# Patient Record
Sex: FEMALE | Race: WHITE | Employment: PART TIME | URBAN - METROPOLITAN AREA
[De-identification: names, ages, dates, MRNs, and addresses within clinical notes are randomized per-mention and may not be internally consistent; named-entity substitution may affect disease eponyms.]

---

## 2017-01-30 DIAGNOSIS — I10 ESSENTIAL HYPERTENSION WITH GOAL BLOOD PRESSURE LESS THAN 140/90: ICD-10-CM

## 2017-01-30 RX ORDER — LOSARTAN POTASSIUM AND HYDROCHLOROTHIAZIDE 25; 100 MG/1; MG/1
TABLET ORAL
Qty: 30 TAB | Refills: 5 | Status: SHIPPED | OUTPATIENT
Start: 2017-01-30 | End: 2017-06-13

## 2017-01-30 RX ORDER — METFORMIN HYDROCHLORIDE 500 MG/1
TABLET ORAL
Qty: 360 TAB | Refills: 1 | Status: SHIPPED | OUTPATIENT
Start: 2017-01-30 | End: 2017-08-02 | Stop reason: SDUPTHER

## 2017-03-09 ENCOUNTER — TELEPHONE (OUTPATIENT)
Dept: INTERNAL MEDICINE CLINIC | Age: 62
End: 2017-03-09

## 2017-03-09 DIAGNOSIS — E11.65 TYPE 2 DIABETES MELLITUS WITH HYPERGLYCEMIA, WITH LONG-TERM CURRENT USE OF INSULIN (HCC): ICD-10-CM

## 2017-03-09 DIAGNOSIS — Z79.4 TYPE 2 DIABETES MELLITUS WITH HYPERGLYCEMIA, WITH LONG-TERM CURRENT USE OF INSULIN (HCC): ICD-10-CM

## 2017-03-09 RX ORDER — GLYBURIDE 5 MG/1
TABLET ORAL
Qty: 360 TAB | Refills: 0 | Status: SHIPPED | OUTPATIENT
Start: 2017-03-09 | End: 2017-06-05 | Stop reason: ALTCHOICE

## 2017-03-09 NOTE — TELEPHONE ENCOUNTER
Contacted pharmacy to advise that pt should not be on both HCTZ medications. Continue with Diovan. Pharmacy understood and will let pt know and discontinue Hyzaar.

## 2017-03-09 NOTE — TELEPHONE ENCOUNTER
[facility] states that patient is telling them that she is taking both Losartan Hydrochlorothiazide and valsartan hydrochlorothiazide and they are wondering if the patient should be taking both of them. They also wanted to know if the refill request that was sent over this morning can be filled also as the patient is going out of town on Saturday.

## 2017-03-21 ENCOUNTER — TELEPHONE (OUTPATIENT)
Dept: INTERNAL MEDICINE CLINIC | Age: 62
End: 2017-03-21

## 2017-03-21 DIAGNOSIS — I10 ESSENTIAL HYPERTENSION WITH GOAL BLOOD PRESSURE LESS THAN 140/90: ICD-10-CM

## 2017-03-21 RX ORDER — VALSARTAN AND HYDROCHLOROTHIAZIDE 320; 25 MG/1; MG/1
TABLET, FILM COATED ORAL
Qty: 90 TAB | Refills: 0 | Status: SHIPPED | OUTPATIENT
Start: 2017-03-21 | End: 2017-06-27 | Stop reason: SDUPTHER

## 2017-03-21 NOTE — TELEPHONE ENCOUNTER
[employer] # [id number] request a return call regarding clarification on Rx Diovan.  [facility] contact  [employer] (pharmacist) [phone]

## 2017-03-21 NOTE — TELEPHONE ENCOUNTER
Contacted pharmacist who is questioning pts medications. The Hyzaar has been discontinued but he is still reflecting that pt is utilizing Valsartan 40mg BID by physician Christos Gonzalez. Advised pharmacist that I would call their office and coordinate pts medications. Per last consult note from endo pt is to take Valsartan 40 mg BID but not listing for Valsartan-HCTZ provided by our office. Dr. Rebekah Dhillon nurse will speak with Dr. Rebekah Dhillon tomorrow and have her return our call.

## 2017-03-22 NOTE — TELEPHONE ENCOUNTER
Dr. Yesenia Cadet returned call this morning and said that she is fine discontinuing the Valsartan 40 mg BID and leaving pt on Diovan-HCTZ along with amlodipine and Metoprolol. Contacted pharmacy to advise of outcome with medication decision. He understood and will discontinue on their end and contact pt.

## 2017-04-06 DIAGNOSIS — Z79.4 TYPE 2 DIABETES MELLITUS WITH HYPERGLYCEMIA, WITH LONG-TERM CURRENT USE OF INSULIN (HCC): ICD-10-CM

## 2017-04-06 DIAGNOSIS — E11.65 TYPE 2 DIABETES MELLITUS WITH HYPERGLYCEMIA, WITH LONG-TERM CURRENT USE OF INSULIN (HCC): ICD-10-CM

## 2017-04-06 RX ORDER — GLYBURIDE 5 MG/1
TABLET ORAL
Qty: 360 TAB | Refills: 0 | Status: SHIPPED | OUTPATIENT
Start: 2017-04-06 | End: 2017-05-03 | Stop reason: SDUPTHER

## 2017-04-20 ENCOUNTER — HOSPITAL ENCOUNTER (OUTPATIENT)
Dept: MAMMOGRAPHY | Age: 62
Discharge: HOME | End: 2017-04-20
Attending: INTERNAL MEDICINE
Payer: COMMERCIAL

## 2017-04-20 DIAGNOSIS — Z12.31 VISIT FOR SCREENING MAMMOGRAM: ICD-10-CM

## 2017-04-20 PROCEDURE — 77067 SCR MAMMO BI INCL CAD: CPT

## 2017-05-03 DIAGNOSIS — E11.65 TYPE 2 DIABETES MELLITUS WITH HYPERGLYCEMIA, WITH LONG-TERM CURRENT USE OF INSULIN (HCC): ICD-10-CM

## 2017-05-03 DIAGNOSIS — Z79.4 TYPE 2 DIABETES MELLITUS WITH HYPERGLYCEMIA, WITH LONG-TERM CURRENT USE OF INSULIN (HCC): ICD-10-CM

## 2017-05-04 RX ORDER — GLYBURIDE 5 MG/1
TABLET ORAL
Qty: 360 TAB | Refills: 0 | Status: SHIPPED | OUTPATIENT
Start: 2017-05-04 | End: 2017-06-05 | Stop reason: ALTCHOICE

## 2017-05-09 ENCOUNTER — HOSPITAL ENCOUNTER (OUTPATIENT)
Dept: MAMMOGRAPHY | Age: 62
Discharge: HOME | End: 2017-05-09
Payer: COMMERCIAL

## 2017-05-09 DIAGNOSIS — R92.8 ABNORMAL MAMMOGRAM: ICD-10-CM

## 2017-05-09 PROCEDURE — 77065 DX MAMMO INCL CAD UNI: CPT

## 2017-05-11 ENCOUNTER — TELEPHONE (OUTPATIENT)
Dept: INTERNAL MEDICINE CLINIC | Age: 62
End: 2017-05-11

## 2017-05-11 NOTE — TELEPHONE ENCOUNTER
Contacted pt to advise of biopsy recommendation. Pt understood and already has appt with Mabel's office.

## 2017-05-11 NOTE — TELEPHONE ENCOUNTER
----- Message from Tiago Senior MD sent at 5/9/2017  4:46 PM EDT -----  Please call she needs biopsy of left breast- ? Meron Nixon

## 2017-05-31 DIAGNOSIS — R92.1 BREAST CALCIFICATIONS: Primary | ICD-10-CM

## 2017-06-05 ENCOUNTER — HOSPITAL ENCOUNTER (OUTPATIENT)
Dept: MAMMOGRAPHY | Age: 62
Discharge: HOME | End: 2017-06-05
Attending: SURGERY
Payer: COMMERCIAL

## 2017-06-05 ENCOUNTER — HOSPITAL ENCOUNTER (OUTPATIENT)
Dept: LAB | Age: 62
Discharge: HOME | End: 2017-06-05

## 2017-06-05 ENCOUNTER — DOCUMENTATION ONLY (OUTPATIENT)
Dept: SURGERY | Age: 62
End: 2017-06-05

## 2017-06-05 ENCOUNTER — OFFICE VISIT (OUTPATIENT)
Dept: SURGERY | Age: 62
End: 2017-06-05

## 2017-06-05 VITALS
HEIGHT: 66 IN | DIASTOLIC BLOOD PRESSURE: 61 MMHG | HEART RATE: 70 BPM | BODY MASS INDEX: 31.5 KG/M2 | SYSTOLIC BLOOD PRESSURE: 156 MMHG | WEIGHT: 196 LBS

## 2017-06-05 DIAGNOSIS — R92.1 BREAST CALCIFICATIONS: ICD-10-CM

## 2017-06-05 DIAGNOSIS — R92.0 MICROCALCIFICATION OF LEFT BREAST ON MAMMOGRAM: Primary | ICD-10-CM

## 2017-06-05 PROCEDURE — 77065 DX MAMMO INCL CAD UNI: CPT

## 2017-06-05 PROCEDURE — 77030030538 MAM STEREO VAC  BX BREAST LT 1ST LESION W/CLIP AND SPECIMEN

## 2017-06-05 RX ORDER — GUAIFENESIN 100 MG/5ML
LIQUID (ML) ORAL
COMMUNITY
Start: 2016-12-07 | End: 2016-12-07

## 2017-06-05 NOTE — LETTER
6/7/2017 9:07 AM 
 
Patient:  Obed Guaman YOB: 1955 Date of Visit: 6/5/2017 Dear Dr. Salma Denton: 
 
 
Thank you for referring Ms. Vonda Anglin to me for evaluation/treatment. Below are the relevant portions of my assessment and plan of care. HISTORY OF PRESENT ILLNESS Obed Guaman is a 57 y.o. female. HPI 
NEW Patient presents for consultation at the request of Dr. Tony Infante for LEFT breast microcalcifications. Patient is not able to palpate a mass. No hx of breast biopsies. 
  
FH: 
- Mother diagnosed with breast cancer at age 61. 
  
Screening mammogram done 4/20//17: BI-RADS 0. LEFT breast diagnostic mammogram done 5/9/17: BI-RADS 4. LEFT breast clustered microcalcs. Past Medical History:  
Diagnosis Date  Diabetes ([facility] [address]  Hyperlipidemia  Hypertension Past Surgical History:  
Procedure Laterality Date  ENDOSCOPY, COLON, DIAGNOSTIC    
 2002 normal  
 HX APPENDECTOMY  HX GYN    
 molar pregnancy  HX GYN    
 CHIQUI part OOphorectomy Social History Social History  Marital status:  Spouse name: N/A  
 Number of children: N/A  
 Years of education: N/A Occupational History  Not on file. Social History Main Topics  Smoking status: Never Smoker  Smokeless tobacco: Never Used  Alcohol use No  
 Drug use: No  
 Sexual activity: No  
 
Other Topics Concern  Not on file Social History Narrative Current Outpatient Prescriptions on File Prior to Visit Medication Sig Dispense Refill  valsartan-hydroCHLOROthiazide (DIOVAN-HCT) 320-25 mg per tablet TAKE 1 TABLET BY MOUTH EVERY DAY 90 Tab 0  
 metFORMIN (GLUCOPHAGE) 500 mg tablet TAKE 2 TABLETS BY MOUTH TWICE A DAY WITH MEALS 360 Tab 1  
 metoprolol succinate (TOPROL-XL) 100 mg tablet TAKE 1 TABLET BY MOUTH DAILY.  90 Tab 1  
 amLODIPine (NORVASC) 10 mg tablet TAKE 1 TABLET BY MOUTH EVERY DAY 90 Tab 1  
  LEVEMIR FLEXTOUCH 100 unit/mL (3 mL) inpn INJECT 120 UNITS SUBCUTANEOUSLY DAILY 45 Pen 3  
 glyBURIDE (DIABETA) 5 mg tablet TAKE 2 TABLETS BY MOUTH TWICE A DAY **MUST BE SEEN FOR FURTHER REFILLS** 120 Tab 5  
 insulin detemir (LEVEMIR FLEXTOUCH) 100 unit/mL (3 mL) inpn INJECT 130 UNITS SUBCUTANEOUSLY DAILY 45 Pen 3  
 Insulin Needles, Disposable, (NOVOFINE 30) 30 gauge x 1/3\" USE TO INJECT INSULIN 6-8 UNITS PRIOR TO MEALS 100 Pen Needle 2  
 insulin aspart (NOVOLOG) 100 unit/mL inpn 20 units at breakfast, 26 units at lunch, 26 units at dinner. ( please give one month) 1 Pen 5  
 glucose blood VI test strips (ONETOUCH ULTRA TEST) strip Test for times daily as directed. 100 Strip 2  valsartan (DIOVAN) 40 mg tablet Take 25 mg by mouth two (2) times a day. 3  
 [DISCONTINUED] glyBURIDE (DIABETA) 5 mg tablet TAKE 2 TABLETS BY MOUTH TWICE A DAY WITH MEALS 360 Tab 0  
 insulin detemir (LEVEMIR FLEXTOUCH) 100 unit/mL (3 mL) inpn 120 Units by SubCUTAneous route daily for 30 days. Please provide a 30 day supply 3 Pen 3  
 [DISCONTINUED] glyBURIDE (DIABETA) 5 mg tablet TAKE 2 TABLETS BY MOUTH TWICE A DAY WITH MEALS 360 Tab 0  
 losartan-hydroCHLOROthiazide (HYZAAR) 100-25 mg per tablet TAKE 1 TABLET BY MOUTH EVERY DAY 30 Tab 5  [DISCONTINUED] glyBURIDE (DIABETA) 5 mg tablet TAKE 2 TABLETS BY MOUTH TWICE A DAY **MUST BE SEEN FOR FURTHER REFILLS** 120 Tab 0  [DISCONTINUED] glyBURIDE (DIABETA) 5 mg tablet TAKE 2 TABLETS BY MOUTH TWICE A DAY **NEEDS APPOINTMENT** 120 Tab 0  [DISCONTINUED] glyBURIDE (DIABETA) 5 mg tablet TAKE 2 TABLETS BY MOUTH TWICE A DAY **NEEDS APPOINTMENT** 120 Tab 5  [DISCONTINUED] glyBURIDE (DIABETA) 5 mg tablet TAKE 2 TABLETS BY MOUTH TWICE A DAY **MUST BE SEEN FOR FURTHER REFILLS** 120 Tab 5  
 olmesartan (BENICAR) 20 mg tablet Take 1 Tab by mouth daily for 30 days. 30 Tab 3 No current facility-administered medications on file prior to visit. No Known Allergies OB History Obstetric Comments Menarche:  13. LMP: 34.  # of Children:  2. Age at Delivery of First Child:  19.   Hysterectomy/oophorectomy:  YES/YES. Breast Bx:  no.  Hx of Breast Feeding:  no. BCP:  yes. Hormone therapy:  no.  
 
  
  
 
 
ROS Constitutional: Negative. HENT: Negative. Eyes: Negative. Cataracts Respiratory: Negative. Cardiovascular: Negative. Gastrointestinal: Negative. Genitourinary: Negative. Musculoskeletal: Negative. Skin: Negative. Neurological: Negative. Endo/Heme/Allergies: Negative. Psychiatric/Behavioral: Negative. Physical Exam  
Cardiovascular: Normal rate and normal heart sounds. Pulmonary/Chest: Breath sounds normal. Right breast exhibits no inverted nipple, no mass, no nipple discharge, no skin change and no tenderness. Left breast exhibits no inverted nipple, no mass, no nipple discharge, no skin change and no tenderness. Breasts are symmetrical.  
Lymphadenopathy:  
     Right cervical: No superficial cervical, no deep cervical and no posterior cervical adenopathy present. Left cervical: No superficial cervical, no deep cervical and no posterior cervical adenopathy present. Right axillary: No pectoral and no lateral adenopathy present. Left axillary: No pectoral and no lateral adenopathy present. Stereotactic Biopsy PROCEDURE : LORAD STEREOTACTIC VACUUM ASSISTED BIOPSY AND RELATED DIGITAL MAMMOGRAPHY OF THE, left BREAST. INDICATION : MICROCALCIFICATIONS. CONSENT : Following a detailed description of the LORAD stereotactic core biopsy procedure, its risks, benefits and possible alternatives (open biopsy), the patient signed the informed consent. The risks and benefits of the procedure have been explained to the patient by the stereotactic technologist and Dr. Escobar Sue.   
IMAGING : Prebiopsy digital stereotactic imaging of the breast were obtained and confirmed the presence of the abnormality in both  and stereotactic views. , The  left Breast was imaged. PROJECTION : CC. 
BIOPSY PROCEDURE : Following sterile preparation of the skin, a cutaneous skin wheel of 1% lidocaine was use as a local anesthetic. a 4mm skin incision was made for the entry site of the biopsy needle. Prefire stereotactic images were obtained to confirm accurate targeting., An 7 gauge Encor needle was used for the biopsy, 7 biopsy specimens were obtained. CLIP PLACEMENT : A marking clip was placed for future mammographic reference and position was confirmed with a 0 degree postfire image. Radha Waller SPECIMEN RADIOGRAPHY : Digital spot mammography of the core biopsy specimens demonstrated microcalcifications corresponding to the targeted calcifications. Radha Waller FINAL PATHOLOGY : Is pending at this time. Radha Waller POST BIOPSY MAMMOGRAM : CLIP IN GOOD POSITION. ASSESSMENT and PLAN 
  ICD-10-CM ICD-9-CM 1. Microcalcification of left breast on mammogram R92.0 793.81 SURGICAL PATHOLOGY Pt presents with abnormal mammo of LT breast with new calcifications. Biopsied site today without complications, which pt tolerated well. Will f/u once path results become available. This plan was reviewed with the patient and patient agrees. All questions were answered. Written by Marina Rizo, as dictated by Dr. Michael Ledesma MD.  
 
If you have questions, please do not hesitate to call me. I look forward to following Ms. Llamas along with you.  
 
 
 
Sincerely, 
 
 
Nathaniel Dahl MD

## 2017-06-05 NOTE — MR AVS SNAPSHOT
Visit Information Date & Time Provider Department Dept. Phone Encounter #  
 6/5/2017  0:68 PM Ariana Schmitz MD [facility] [phone] [id number] Your Appointments 6/13/2017  2:30 PM  
ROUTINE CARE with Franklin Lin MD  
[facility] [address]) Appt Note: 6 month for med eval; 6 mo med eval  
 [address] [facility] [address]  
[phone]  
  
   
 [address] [place] [address] [address] Upcoming Health Maintenance Date Due  
 PAP AKA CERVICAL CYTOLOGY 8/10/2013 HEMOGLOBIN A1C Q6M 1/29/2016 MICROALBUMIN Q1 7/29/2016 LIPID PANEL Q1 7/29/2016 FOOT EXAM Q1 6/22/2017 INFLUENZA AGE 9 TO ADULT 8/1/2017 EYE EXAM RETINAL OR DILATED Q1 12/2/2017 BREAST CANCER SCRN MAMMOGRAM 5/9/2019 COLONOSCOPY 6/26/2025 DTaP/Tdap/Td series (2 - Td) 6/22/2026 Allergies as of 6/5/2017  Review Complete On: 6/5/2017 By: Wendall Sicard, RN No Known Allergies Current Immunizations  Never Reviewed Name Date Pneumococcal Polysaccharide (PPSV-23) 6/22/2016 Not reviewed this visit You Were Diagnosed With   
  
 Codes Comments Microcalcification of left breast on mammogram    -  Primary ICD-10-CM: R92.0 ICD-9-CM: 793.81 Vitals BP Pulse Height(growth percentile) Weight(growth percentile) LMP BMI  
 156/61 70 5' 6\" (1.676 m) 196 lb (88.9 kg) 01/01/1995 31.64 kg/m2 OB Status Smoking Status Hysterectomy Never Smoker BMI and BSA Data Body Mass Index Body Surface Area  
 31.64 kg/m 2 2.03 m 2 Preferred Pharmacy Pharmacy Name Phone [id number] [address] [phone] Your Updated Medication List  
  
   
This list is accurate as of: 6/5/17  4:25 PM.  Always use your most recent med list. amLODIPine 10 mg tablet Commonly known as:  Karyle Rohrer TAKE 1 TABLET BY MOUTH EVERY DAY  
  
 glucose blood VI test strips strip Commonly known as:  ONETOUCH ULTRA TEST Test for times daily as directed. glyBURIDE 5 mg tablet Commonly known as:  Jackie Can TAKE 2 TABLETS BY MOUTH TWICE A DAY **MUST BE SEEN FOR FURTHER REFILLS**  
  
 insulin aspart 100 unit/mL Inpn Commonly known as:  [place] 20 units at breakfast, 26 units at lunch, 26 units at dinner. ( please give one month) * insulin detemir 100 unit/mL (3 mL) Inpn Commonly known as:  Vaz Pimple INJECT 130 UNITS SUBCUTANEOUSLY DAILY * LEVEMIR FLEXTOUCH 100 unit/mL (3 mL) Inpn Generic drug:  insulin detemir INJECT 120 UNITS SUBCUTANEOUSLY DAILY  
  
 * insulin detemir 100 unit/mL (3 mL) Inpn Commonly known as:  LEVEMIR FLEXTOUCH  
120 Units by SubCUTAneous route daily for 30 days. Please provide a 30 day supply Insulin Needles (Disposable) 30 gauge x 1/3\" Commonly known as:  NOVOFINE 30  
USE TO INJECT INSULIN 6-8 UNITS PRIOR TO MEALS  
  
 losartan-hydroCHLOROthiazide 100-25 mg per tablet Commonly known as:  HYZAAR  
TAKE 1 TABLET BY MOUTH EVERY DAY  
  
 metFORMIN 500 mg tablet Commonly known as:  GLUCOPHAGE  
TAKE 2 TABLETS BY MOUTH TWICE A DAY WITH MEALS  
  
 metoprolol succinate 100 mg tablet Commonly known as:  TOPROL-XL  
TAKE 1 TABLET BY MOUTH DAILY. olmesartan 20 mg tablet Commonly known as:  [employer] Take 1 Tab by mouth daily for 30 days. valsartan 40 mg tablet Commonly known as:  DIOVAN Take 25 mg by mouth two (2) times a day. valsartan-hydroCHLOROthiazide 320-25 mg per tablet Commonly known as:  DIOVAN-HCT  
TAKE 1 TABLET BY MOUTH EVERY DAY  
  
 * Notice: This list has 3 medication(s) that are the same as other medications prescribed for you. Read the directions carefully, and ask your doctor or other care provider to review them with you. We Performed the Following SURGICAL PATHOLOGY [[id number] Custom] Comments: LEFT breast stereotactic biopsy done at 2:35 PM. Specimen in formalin at 2:40 PM.  
  
Patient Instructions Stereotactic Breast Biopsy: About This Test 
What is it? Stereotactic breast biopsy is a test that uses imaging, such as X-ray, to find an area of your breast where a tissue sample will be taken. The sample is looked at under a microscope to check for signs of breast cancer. Why is this test done? This type of breast biopsy is usually done to check for cancer in a lump found during a mammogram. 
How can you prepare for the test? 
Talk to your doctor about all your health conditions before the test. For example, tell your doctor if you: · Are taking any medicines. · Are allergic to any medicines. · Are allergic to latex. · Have had bleeding problems, or if you take aspirin or some other blood thinner. · Are or might be pregnant. · Have a problem with lying on your stomach for 30 to 40 minutes. What happens before the test? 
· You will take off your clothing above the waist. A paper or cloth gown will cover your shoulders. · Your skin is washed with a special soap. · You will be given a shot of medicine to numb the biopsy area on your breast. 
· Images are taken to find the exact site for the biopsy. What happens during the test? 
· You will lie on your stomach on a table that has a hole for your breast to hang through. · Once the area in your breast is numb, a small cut (incision) is made in the skin. · Using the imaging, the doctor will guide the needle into the biopsy area. · A sample of breast tissue is taken through the needle. · A small clip is usually inserted into your breast to chris the biopsy site. · The needle is removed and pressure put on the needle site to stop any bleeding. · A bandage is put on the needle site. What else should you know about the test? 
· The small cut for the needle does not usually need stitches. How long does the test take? · The test will take about 60 minutes. Most of the time is spent preparing for the images and finding the area for the biopsy. What happens after the test? 
· You'll be told how long it may take to get your results back. · You will probably be able to go home right away. · You can go back to your usual activities right away, but avoid heavy lifting for 24 hours. · The site may be tender for 2 or 3 days. You may also have some bruising, swelling, or slight bleeding. ¨ You can use an ice pack. Put ice or a cold pack on the area for 10 to 20 minutes at a time. Put a thin cloth between the ice and your skin. ¨ Ask your doctor if you can take an over-the-counter pain medicine, such as acetaminophen (Tylenol), ibuprofen (Advil, Motrin), or naproxen (Aleve). Be safe with medicines. Read and follow all instructions on the label. · After a specialist looks at the biopsy sample for signs of cancer, your doctor's office will let you know the results. · If the test results are not clear, you may have another biopsy or test. 
Follow-up care is a key part of your treatment and safety. Be sure to make and go to all appointments, and call your doctor if you are having problems. It's also a good idea to keep a list of the medicines you take. Ask your doctor when you can expect to have your test results. Where can you learn more? Go to http://scar-balta.info/. Enter Y286 in the search box to learn more about \"Stereotactic Breast Biopsy: About This Test.\" Current as of: August 1, 2016 Content Version: 11.2 © 4330-3085 [employer]wise, Inc[employer]. Care instructions adapted under license by [employer] (which disclaims liability or warranty for this information). If you have questions about a medical condition or this instruction, always ask your healthcare professional. [address] any warranty or liability for your use of this information. Introducing [facility]! Dear Stan Reddy: 
Thank you for requesting a AnyMeeting account. Our records indicate that you have previously registered for a AnyMeeting account but its currently inactive. Please call our AnyMeeting support line at [phone]. Additional Information If you have questions, please visit the Frequently Asked Questions section of the Any[employer]eting website at [email or web address] Remember, AnyMeeting is NOT to be used for urgent needs. For medical emergencies, dial 911. Now available from your iPhone and Android! Please provide this summary of care documentation to your next provider. Your primary care clinician is listed as Honk Romeo. If you have any questions after today's visit, please call [phone].

## 2017-06-05 NOTE — PROGRESS NOTES
HISTORY OF PRESENT ILLNESS  Thor Lipscomb is a 57 y.o. female. HPI NEW Patient presents for consultation at the request of Dr. Lula Bermeo for LEFT breast microcalcifications. Patient is not able to palpate a mass. No hx of breast biopsies. FH:  - Mother diagnosed with breast cancer at age 61. Screening mammogram done 4/20//17: BI-RADS 0. LEFT breast diagnostic mammogram done 5/9/17: BI-RADS 4. LEFT breast clustered microcalcs. Review of Systems   Constitutional: Negative. HENT: Negative. Eyes: Negative. Cataracts   Respiratory: Negative. Cardiovascular: Negative. Gastrointestinal: Negative. Genitourinary: Negative. Musculoskeletal: Negative. Skin: Negative. Neurological: Negative. Endo/Heme/Allergies: Negative. Psychiatric/Behavioral: Negative.         Physical Exam    ASSESSMENT and PLAN  {ASSESSMENT/PLAN:86228}

## 2017-06-05 NOTE — PROGRESS NOTES
HISTORY OF PRESENT ILLNESS  Jennifer Corona is a 57 y.o. female. HPI  NEW Patient presents for consultation at the request of Dr. Carmine Peterson for LEFT breast microcalcifications. Patient is not able to palpate a mass. No hx of breast biopsies.     FH:  - Mother diagnosed with breast cancer at age 61.     Screening mammogram done 4/20//17: BI-RADS 0. LEFT breast diagnostic mammogram done 5/9/17: BI-RADS 4. LEFT breast clustered microcalcs. Past Medical History:   Diagnosis Date    Diabetes ([address]     Hyperlipidemia     Hypertension        Past Surgical History:   Procedure Laterality Date    ENDOSCOPY, COLON, DIAGNOSTIC      2002 normal    HX APPENDECTOMY      HX GYN      molar pregnancy    HX GYN      CHIQUI part OOphorectomy       Social History     Social History    Marital status:      Spouse name: N/A    Number of children: N/A    Years of education: N/A     Occupational History    Not on file. Social History Main Topics    Smoking status: Never Smoker    Smokeless tobacco: Never Used    Alcohol use No    Drug use: No    Sexual activity: No     Other Topics Concern    Not on file     Social History Narrative       Current Outpatient Prescriptions on File Prior to Visit   Medication Sig Dispense Refill    valsartan-hydroCHLOROthiazide (DIOVAN-HCT) 320-25 mg per tablet TAKE 1 TABLET BY MOUTH EVERY DAY 90 Tab 0    metFORMIN (GLUCOPHAGE) 500 mg tablet TAKE 2 TABLETS BY MOUTH TWICE A DAY WITH MEALS 360 Tab 1    metoprolol succinate (TOPROL-XL) 100 mg tablet TAKE 1 TABLET BY MOUTH DAILY.  90 Tab 1    amLODIPine (NORVASC) 10 mg tablet TAKE 1 TABLET BY MOUTH EVERY DAY 90 Tab 1    LEVEMIR FLEXTOUCH 100 unit/mL (3 mL) inpn INJECT 120 UNITS SUBCUTANEOUSLY DAILY 45 Pen 3    glyBURIDE (DIABETA) 5 mg tablet TAKE 2 TABLETS BY MOUTH TWICE A DAY **MUST BE SEEN FOR FURTHER REFILLS** 120 Tab 5    insulin detemir (LEVEMIR FLEXTOUCH) 100 unit/mL (3 mL) inpn INJECT 130 UNITS SUBCUTANEOUSLY DAILY 45 Pen 3    Insulin Needles, Disposable, (NOVOFINE 30) 30 gauge x 1/3\" USE TO INJECT INSULIN 6-8 UNITS PRIOR TO MEALS 100 Pen Needle 2    insulin aspart (NOVOLOG) 100 unit/mL inpn 20 units at breakfast, 26 units at lunch, 26 units at dinner. ( please give one month) 1 Pen 5    glucose blood VI test strips (ONETOUCH ULTRA TEST) strip Test for times daily as directed. 100 Strip 2    valsartan (DIOVAN) 40 mg tablet Take 25 mg by mouth two (2) times a day. 3    [DISCONTINUED] glyBURIDE (DIABETA) 5 mg tablet TAKE 2 TABLETS BY MOUTH TWICE A DAY WITH MEALS 360 Tab 0    insulin detemir (LEVEMIR FLEXTOUCH) 100 unit/mL (3 mL) inpn 120 Units by SubCUTAneous route daily for 30 days. Please provide a 30 day supply 3 Pen 3    [DISCONTINUED] glyBURIDE (DIABETA) 5 mg tablet TAKE 2 TABLETS BY MOUTH TWICE A DAY WITH MEALS 360 Tab 0    losartan-hydroCHLOROthiazide (HYZAAR) 100-25 mg per tablet TAKE 1 TABLET BY MOUTH EVERY DAY 30 Tab 5    [DISCONTINUED] glyBURIDE (DIABETA) 5 mg tablet TAKE 2 TABLETS BY MOUTH TWICE A DAY **MUST BE SEEN FOR FURTHER REFILLS** 120 Tab 0    [DISCONTINUED] glyBURIDE (DIABETA) 5 mg tablet TAKE 2 TABLETS BY MOUTH TWICE A DAY **NEEDS APPOINTMENT** 120 Tab 0    [DISCONTINUED] glyBURIDE (DIABETA) 5 mg tablet TAKE 2 TABLETS BY MOUTH TWICE A DAY **NEEDS APPOINTMENT** 120 Tab 5    [DISCONTINUED] glyBURIDE (DIABETA) 5 mg tablet TAKE 2 TABLETS BY MOUTH TWICE A DAY **MUST BE SEEN FOR FURTHER REFILLS** 120 Tab 5    olmesartan (BENICAR) 20 mg tablet Take 1 Tab by mouth daily for 30 days. 30 Tab 3     No current facility-administered medications on file prior to visit. No Known Allergies    OB History     Obstetric Comments    Menarche:  13. LMP: 34.  # of Children:  2. Age at Delivery of First Child:  19.   Hysterectomy/oophorectomy:  YES/YES. Breast Bx:  no.  Hx of Breast Feeding:  no. BCP:  yes. Hormone therapy:  no.               ROS  Constitutional: Negative. HENT: Negative. Eyes: Negative. Cataracts   Respiratory: Negative. Cardiovascular: Negative. Gastrointestinal: Negative. Genitourinary: Negative. Musculoskeletal: Negative. Skin: Negative. Neurological: Negative. Endo/Heme/Allergies: Negative. Psychiatric/Behavioral: Negative. Physical Exam   Cardiovascular: Normal rate and normal heart sounds. Pulmonary/Chest: Breath sounds normal. Right breast exhibits no inverted nipple, no mass, no nipple discharge, no skin change and no tenderness. Left breast exhibits no inverted nipple, no mass, no nipple discharge, no skin change and no tenderness. Breasts are symmetrical.   Lymphadenopathy:        Right cervical: No superficial cervical, no deep cervical and no posterior cervical adenopathy present. Left cervical: No superficial cervical, no deep cervical and no posterior cervical adenopathy present. Right axillary: No pectoral and no lateral adenopathy present. Left axillary: No pectoral and no lateral adenopathy present. Stereotactic Biopsy  PROCEDURE : LORAD STEREOTACTIC VACUUM ASSISTED BIOPSY AND RELATED DIGITAL MAMMOGRAPHY OF THE, left BREAST. INDICATION : MICROCALCIFICATIONS. CONSENT : Following a detailed description of the LORAD stereotactic core biopsy procedure, its risks, benefits and possible alternatives (open biopsy), the patient signed the informed consent. The risks and benefits of the procedure have been explained to the patient by the stereotactic technologist and Dr. Brody Bishop. IMAGING : Prebiopsy digital stereotactic imaging of the breast were obtained and confirmed the presence of the abnormality in both  and stereotactic views. , The  left Breast was imaged. PROJECTION : CC.  BIOPSY PROCEDURE : Following sterile preparation of the skin, a cutaneous skin wheel of 1% lidocaine was use as a local anesthetic. a 4mm skin incision was made for the entry site of the biopsy needle.  Prefire stereotactic images were obtained to confirm accurate targeting., An 7 gauge Encor needle was used for the biopsy, 7 biopsy specimens were obtained. CLIP PLACEMENT : A marking clip was placed for future mammographic reference and position was confirmed with a 0 degree postfire image. Leandra Montaño SPECIMEN RADIOGRAPHY : Digital spot mammography of the core biopsy specimens demonstrated microcalcifications corresponding to the targeted calcifications. Leandra Montaño FINAL PATHOLOGY : Is pending at this time. Leandra Montaño POST BIOPSY MAMMOGRAM : CLIP IN GOOD POSITION. ASSESSMENT and PLAN    ICD-10-CM ICD-9-CM    1. Microcalcification of left breast on mammogram R92.0 793.81 SURGICAL PATHOLOGY     Pt presents with abnormal mammo of LT breast with new calcifications. Biopsied site today without complications, which pt tolerated well. Will f/u once path results become available. This plan was reviewed with the patient and patient agrees. All questions were answered.     Written by [address], as dictated by Dr. Alvarez Barnett MD.

## 2017-06-05 NOTE — PATIENT INSTRUCTIONS
Stereotactic Breast Biopsy: About This Test  What is it? Stereotactic breast biopsy is a test that uses imaging, such as X-ray, to find an area of your breast where a tissue sample will be taken. The sample is looked at under a microscope to check for signs of breast cancer. Why is this test done? This type of breast biopsy is usually done to check for cancer in a lump found during a mammogram.  How can you prepare for the test?  Talk to your doctor about all your health conditions before the test. For example, tell your doctor if you:  · Are taking any medicines. · Are allergic to any medicines. · Are allergic to latex. · Have had bleeding problems, or if you take aspirin or some other blood thinner. · Are or might be pregnant. · Have a problem with lying on your stomach for 30 to 40 minutes. What happens before the test?  · You will take off your clothing above the waist. A paper or cloth gown will cover your shoulders. · Your skin is washed with a special soap. · You will be given a shot of medicine to numb the biopsy area on your breast.  · Images are taken to find the exact site for the biopsy. What happens during the test?  · You will lie on your stomach on a table that has a hole for your breast to hang through. · Once the area in your breast is numb, a small cut (incision) is made in the skin. · Using the imaging, the doctor will guide the needle into the biopsy area. · A sample of breast tissue is taken through the needle. · A small clip is usually inserted into your breast to chrsi the biopsy site. · The needle is removed and pressure put on the needle site to stop any bleeding. · A bandage is put on the needle site. What else should you know about the test?  · The small cut for the needle does not usually need stitches. How long does the test take? · The test will take about 60 minutes. Most of the time is spent preparing for the images and finding the area for the biopsy.   What happens after the test?  · You'll be told how long it may take to get your results back. · You will probably be able to go home right away. · You can go back to your usual activities right away, but avoid heavy lifting for 24 hours. · The site may be tender for 2 or 3 days. You may also have some bruising, swelling, or slight bleeding. ¨ You can use an ice pack. Put ice or a cold pack on the area for 10 to 20 minutes at a time. Put a thin cloth between the ice and your skin. ¨ Ask your doctor if you can take an over-the-counter pain medicine, such as acetaminophen (Tylenol), ibuprofen (Advil, Motrin), or naproxen (Aleve). Be safe with medicines. Read and follow all instructions on the label. · After a specialist looks at the biopsy sample for signs of cancer, your doctor's office will let you know the results. · If the test results are not clear, you may have another biopsy or test.  Follow-up care is a key part of your treatment and safety. Be sure to make and go to all appointments, and call your doctor if you are having problems. It's also a good idea to keep a list of the medicines you take. Ask your doctor when you can expect to have your test results. Where can you learn more? Go to http://scar-balta.info/. Enter Y286 in the search box to learn more about \"Stereotactic Breast Biopsy: About This Test.\"  Current as of: August 1, 2016  Content Version: 11.2  © 4330-5621 [employer]. Care instructions adapted under license by [employer] (which disclaims liability or warranty for this information). If you have questions about a medical condition or this instruction, always ask your healthcare professional. [address] any warranty or liability for your use of this information.

## 2017-06-06 ENCOUNTER — TELEPHONE (OUTPATIENT)
Dept: SURGERY | Age: 62
End: 2017-06-06

## 2017-06-07 NOTE — COMMUNICATION BODY
HISTORY OF PRESENT ILLNESS  Giovany Kruger is a 57 y.o. female. HPI  NEW Patient presents for consultation at the request of Dr. Anthony Ellis for LEFT breast microcalcifications. Patient is not able to palpate a mass. No hx of breast biopsies.     FH:  - Mother diagnosed with breast cancer at age 61.     Screening mammogram done 4/20//17: BI-RADS 0. LEFT breast diagnostic mammogram done 5/9/17: BI-RADS 4. LEFT breast clustered microcalcs. Past Medical History:   Diagnosis Date    Diabetes ([address]     Hyperlipidemia     Hypertension        Past Surgical History:   Procedure Laterality Date    ENDOSCOPY, COLON, DIAGNOSTIC      2002 normal    HX APPENDECTOMY      HX GYN      molar pregnancy    HX GYN      CHIQUI part OOphorectomy       Social History     Social History    Marital status:      Spouse name: N/A    Number of children: N/A    Years of education: N/A     Occupational History    Not on file. Social History Main Topics    Smoking status: Never Smoker    Smokeless tobacco: Never Used    Alcohol use No    Drug use: No    Sexual activity: No     Other Topics Concern    Not on file     Social History Narrative       Current Outpatient Prescriptions on File Prior to Visit   Medication Sig Dispense Refill    valsartan-hydroCHLOROthiazide (DIOVAN-HCT) 320-25 mg per tablet TAKE 1 TABLET BY MOUTH EVERY DAY 90 Tab 0    metFORMIN (GLUCOPHAGE) 500 mg tablet TAKE 2 TABLETS BY MOUTH TWICE A DAY WITH MEALS 360 Tab 1    metoprolol succinate (TOPROL-XL) 100 mg tablet TAKE 1 TABLET BY MOUTH DAILY.  90 Tab 1    amLODIPine (NORVASC) 10 mg tablet TAKE 1 TABLET BY MOUTH EVERY DAY 90 Tab 1    LEVEMIR FLEXTOUCH 100 unit/mL (3 mL) inpn INJECT 120 UNITS SUBCUTANEOUSLY DAILY 45 Pen 3    glyBURIDE (DIABETA) 5 mg tablet TAKE 2 TABLETS BY MOUTH TWICE A DAY **MUST BE SEEN FOR FURTHER REFILLS** 120 Tab 5    insulin detemir (LEVEMIR FLEXTOUCH) 100 unit/mL (3 mL) inpn INJECT 130 UNITS SUBCUTANEOUSLY DAILY 45 Pen 3    Insulin Needles, Disposable, (NOVOFINE 30) 30 gauge x 1/3\" USE TO INJECT INSULIN 6-8 UNITS PRIOR TO MEALS 100 Pen Needle 2    insulin aspart (NOVOLOG) 100 unit/mL inpn 20 units at breakfast, 26 units at lunch, 26 units at dinner. ( please give one month) 1 Pen 5    glucose blood VI test strips (ONETOUCH ULTRA TEST) strip Test for times daily as directed. 100 Strip 2    valsartan (DIOVAN) 40 mg tablet Take 25 mg by mouth two (2) times a day. 3    [DISCONTINUED] glyBURIDE (DIABETA) 5 mg tablet TAKE 2 TABLETS BY MOUTH TWICE A DAY WITH MEALS 360 Tab 0    insulin detemir (LEVEMIR FLEXTOUCH) 100 unit/mL (3 mL) inpn 120 Units by SubCUTAneous route daily for 30 days. Please provide a 30 day supply 3 Pen 3    [DISCONTINUED] glyBURIDE (DIABETA) 5 mg tablet TAKE 2 TABLETS BY MOUTH TWICE A DAY WITH MEALS 360 Tab 0    losartan-hydroCHLOROthiazide (HYZAAR) 100-25 mg per tablet TAKE 1 TABLET BY MOUTH EVERY DAY 30 Tab 5    [DISCONTINUED] glyBURIDE (DIABETA) 5 mg tablet TAKE 2 TABLETS BY MOUTH TWICE A DAY **MUST BE SEEN FOR FURTHER REFILLS** 120 Tab 0    [DISCONTINUED] glyBURIDE (DIABETA) 5 mg tablet TAKE 2 TABLETS BY MOUTH TWICE A DAY **NEEDS APPOINTMENT** 120 Tab 0    [DISCONTINUED] glyBURIDE (DIABETA) 5 mg tablet TAKE 2 TABLETS BY MOUTH TWICE A DAY **NEEDS APPOINTMENT** 120 Tab 5    [DISCONTINUED] glyBURIDE (DIABETA) 5 mg tablet TAKE 2 TABLETS BY MOUTH TWICE A DAY **MUST BE SEEN FOR FURTHER REFILLS** 120 Tab 5    olmesartan (BENICAR) 20 mg tablet Take 1 Tab by mouth daily for 30 days. 30 Tab 3     No current facility-administered medications on file prior to visit. No Known Allergies    OB History     Obstetric Comments    Menarche:  13. LMP: 34.  # of Children:  2. Age at Delivery of First Child:  19.   Hysterectomy/oophorectomy:  YES/YES. Breast Bx:  no.  Hx of Breast Feeding:  no. BCP:  yes. Hormone therapy:  no.               ROS  Constitutional: Negative. HENT: Negative. Eyes: Negative. Cataracts   Respiratory: Negative. Cardiovascular: Negative. Gastrointestinal: Negative. Genitourinary: Negative. Musculoskeletal: Negative. Skin: Negative. Neurological: Negative. Endo/Heme/Allergies: Negative. Psychiatric/Behavioral: Negative. Physical Exam   Cardiovascular: Normal rate and normal heart sounds. Pulmonary/Chest: Breath sounds normal. Right breast exhibits no inverted nipple, no mass, no nipple discharge, no skin change and no tenderness. Left breast exhibits no inverted nipple, no mass, no nipple discharge, no skin change and no tenderness. Breasts are symmetrical.   Lymphadenopathy:        Right cervical: No superficial cervical, no deep cervical and no posterior cervical adenopathy present. Left cervical: No superficial cervical, no deep cervical and no posterior cervical adenopathy present. Right axillary: No pectoral and no lateral adenopathy present. Left axillary: No pectoral and no lateral adenopathy present. Stereotactic Biopsy  PROCEDURE : LORAD STEREOTACTIC VACUUM ASSISTED BIOPSY AND RELATED DIGITAL MAMMOGRAPHY OF THE, left BREAST. INDICATION : MICROCALCIFICATIONS. CONSENT : Following a detailed description of the LORAD stereotactic core biopsy procedure, its risks, benefits and possible alternatives (open biopsy), the patient signed the informed consent. The risks and benefits of the procedure have been explained to the patient by the stereotactic technologist and Dr. Rogelio Hancock. IMAGING : Prebiopsy digital stereotactic imaging of the breast were obtained and confirmed the presence of the abnormality in both  and stereotactic views. , The  left Breast was imaged. PROJECTION : CC.  BIOPSY PROCEDURE : Following sterile preparation of the skin, a cutaneous skin wheel of 1% lidocaine was use as a local anesthetic. a 4mm skin incision was made for the entry site of the biopsy needle.  Prefire stereotactic images were obtained to confirm accurate targeting., An 7 gauge Encor needle was used for the biopsy, 7 biopsy specimens were obtained. CLIP PLACEMENT : A marking clip was placed for future mammographic reference and position was confirmed with a 0 degree postfire image. Geno Bob SPECIMEN RADIOGRAPHY : Digital spot mammography of the core biopsy specimens demonstrated microcalcifications corresponding to the targeted calcifications. Geno Bob FINAL PATHOLOGY : Is pending at this time. Geno Bob POST BIOPSY MAMMOGRAM : CLIP IN GOOD POSITION. ASSESSMENT and PLAN    ICD-10-CM ICD-9-CM    1. Microcalcification of left breast on mammogram R92.0 793.81 SURGICAL PATHOLOGY     Pt presents with abnormal mammo of LT breast with new calcifications. Biopsied site today without complications, which pt tolerated well. Will f/u once path results become available. This plan was reviewed with the patient and patient agrees. All questions were answered.     Written by Megan Whitlock, as dictated by Dr. Andre Patel MD.

## 2017-06-13 ENCOUNTER — OFFICE VISIT (OUTPATIENT)
Dept: INTERNAL MEDICINE CLINIC | Age: 62
End: 2017-06-13

## 2017-06-13 VITALS
BODY MASS INDEX: 31.69 KG/M2 | RESPIRATION RATE: 14 BRPM | WEIGHT: 197.2 LBS | HEIGHT: 66 IN | DIASTOLIC BLOOD PRESSURE: 59 MMHG | TEMPERATURE: 98 F | SYSTOLIC BLOOD PRESSURE: 127 MMHG | HEART RATE: 67 BPM | OXYGEN SATURATION: 98 %

## 2017-06-13 DIAGNOSIS — E78.2 MIXED HYPERLIPIDEMIA: ICD-10-CM

## 2017-06-13 DIAGNOSIS — E11.65 TYPE 2 DIABETES MELLITUS WITH HYPERGLYCEMIA, WITH LONG-TERM CURRENT USE OF INSULIN (HCC): Primary | ICD-10-CM

## 2017-06-13 DIAGNOSIS — Z79.4 TYPE 2 DIABETES MELLITUS WITH HYPERGLYCEMIA, WITH LONG-TERM CURRENT USE OF INSULIN (HCC): Primary | ICD-10-CM

## 2017-06-13 DIAGNOSIS — I10 ESSENTIAL HYPERTENSION: ICD-10-CM

## 2017-06-13 NOTE — PROGRESS NOTES
HISTORY OF PRESENT ILLNESS  Angie Petit is a 57 y.o. female. HPI  Patient presents today for a check up of chronic conditions. Patient reports that she started to walk again. She states that she is not stressing as much as last year. Patient notes her mood is doing well. She states that she has been following up with endocrinologist.    Hyperlipidemia:  Cardiovascular risk analysis - 57 y.o. female LDL goal is under 100. ROS: taking medications as instructed, no medication side effects noted, no TIA's, no chest pain on exertion, no dyspnea on exertion, no swelling of ankles. Tolerating meds, no myalgias or other side effects noted  New concerns: LDL was 112, HDL was 33, on 12/16/13. Hypertension ROS: taking medications as instructed, no medication side effects noted, no TIA's, no chest pain on exertion, no dyspnea on exertion, no swelling of ankles. New concerns:  Patient's BP in office today is 127/59. Diabetic Review of Systems - medication compliance: compliant all of the time, diabetic diet compliance: compliant most of the time, home glucose monitoring: is performed. 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 She admits that she is taking her long actin insulin regularly. Patient denies neuropathy symptoms recently. She states that some nights her hand feels tingly. Review of Systems   All other systems reviewed and are negative. Physical Exam   Constitutional: She is oriented to person, place, and time. She appears well-developed and well-nourished. HENT:   Head: Normocephalic and atraumatic. Right Ear: External ear normal.   Left Ear: External ear normal.   Nose: Nose normal.   Mouth/Throat: Oropharynx is clear and moist.   Eyes: Conjunctivae and EOM are normal.   Neck: Normal range of motion. Neck supple. Carotid bruit is not present. No thyroid mass and no thyromegaly present. Cardiovascular: Normal rate, regular rhythm, S1 normal, S2 normal, normal heart sounds and intact distal pulses. Pulmonary/Chest: Effort normal and breath sounds normal.   Abdominal: Soft. Normal appearance and bowel sounds are normal. There is no hepatosplenomegaly. There is no tenderness. Musculoskeletal: Normal range of motion. Neurological: She is alert and oriented to person, place, and time. She has normal strength. No cranial nerve deficit or sensory deficit. Coordination normal.   Skin: Skin is warm, dry and intact. No abrasion and no rash noted. Psychiatric: She has a normal mood and affect. Her behavior is normal. Judgment and thought content normal.   Nursing note and vitals reviewed. ASSESSMENT and PLAN  Sonia Enma was seen today for medication evaluation. Diagnoses and all orders for this visit:    Type 2 diabetes mellitus with hyperglycemia, with long-term current use of insulin ([address]  Strongly encouraged patient to walk more to decrease insulin intake. Stressed the importance of diabetic complaint diet. Essential hypertension  BP is stable today. Continue current meds    Mixed hyperlipidemia  LDL was 112, HDL was 33, on 12/16/13. Get lab work at endocrinologist. Should be a LDL less than 100    lab results and schedule of future lab studies reviewed with patient  reviewed diet, exercise and weight control    Written by Marce Morales, as dictated by Lito Plunkett MD.     Current diagnosis and concerns discussed with pt at length. Understands risks and benefits or current treatment plan and medications and accepts the treatment and medication with any possible risks. Pt asks appropriate questions which were answered. Pt instructed to call with any concerns or problems.

## 2017-06-27 DIAGNOSIS — I10 ESSENTIAL HYPERTENSION WITH GOAL BLOOD PRESSURE LESS THAN 140/90: ICD-10-CM

## 2017-06-27 RX ORDER — VALSARTAN AND HYDROCHLOROTHIAZIDE 320; 25 MG/1; MG/1
TABLET, FILM COATED ORAL
Qty: 90 TAB | Refills: 0 | Status: SHIPPED | OUTPATIENT
Start: 2017-06-27 | End: 2017-08-02 | Stop reason: SDUPTHER

## 2017-07-18 DIAGNOSIS — E11.8 CONTROLLED DIABETES MELLITUS TYPE 2 WITH COMPLICATIONS, UNSPECIFIED LONG TERM INSULIN USE STATUS: ICD-10-CM

## 2017-07-18 RX ORDER — INSULIN ASPART 100 [IU]/ML
INJECTION, SOLUTION INTRAVENOUS; SUBCUTANEOUS
Qty: 1 PEN | Refills: 3 | Status: SHIPPED | OUTPATIENT
Start: 2017-07-18 | End: 2018-04-25 | Stop reason: SDUPTHER

## 2017-08-02 DIAGNOSIS — E11.8 CONTROLLED DIABETES MELLITUS TYPE 2 WITH COMPLICATIONS, UNSPECIFIED LONG TERM INSULIN USE STATUS: ICD-10-CM

## 2017-08-02 DIAGNOSIS — I10 ESSENTIAL HYPERTENSION WITH GOAL BLOOD PRESSURE LESS THAN 140/90: ICD-10-CM

## 2017-08-02 DIAGNOSIS — E11.9 DIABETES MELLITUS TYPE 2, CONTROLLED (HCC): ICD-10-CM

## 2017-08-02 RX ORDER — INSULIN ASPART 100 [IU]/ML
INJECTION, SOLUTION INTRAVENOUS; SUBCUTANEOUS
Qty: 1 PEN | Refills: 3 | Status: CANCELLED | OUTPATIENT
Start: 2017-08-02

## 2017-08-03 RX ORDER — METFORMIN HYDROCHLORIDE 500 MG/1
TABLET ORAL
Qty: 360 TAB | Refills: 1 | Status: SHIPPED | OUTPATIENT
Start: 2017-08-03 | End: 2018-04-25 | Stop reason: SDUPTHER

## 2017-08-03 RX ORDER — METOPROLOL SUCCINATE 100 MG/1
TABLET, EXTENDED RELEASE ORAL
Qty: 90 TAB | Refills: 1 | Status: SHIPPED | OUTPATIENT
Start: 2017-08-03 | End: 2018-04-25 | Stop reason: SDUPTHER

## 2017-08-03 RX ORDER — VALSARTAN AND HYDROCHLOROTHIAZIDE 320; 25 MG/1; MG/1
TABLET, FILM COATED ORAL
Qty: 90 TAB | Refills: 1 | Status: SHIPPED | OUTPATIENT
Start: 2017-08-03 | End: 2018-04-25 | Stop reason: SDUPTHER

## 2017-08-03 RX ORDER — GLYBURIDE 5 MG/1
TABLET ORAL
Qty: 120 TAB | Refills: 5 | Status: SHIPPED | OUTPATIENT
Start: 2017-08-03 | End: 2018-02-22 | Stop reason: SDUPTHER

## 2017-08-03 RX ORDER — AMLODIPINE BESYLATE 10 MG/1
TABLET ORAL
Qty: 90 TAB | Refills: 1 | Status: SHIPPED | OUTPATIENT
Start: 2017-08-03 | End: 2018-04-25 | Stop reason: SDUPTHER

## 2017-08-03 RX ORDER — OLMESARTAN MEDOXOMIL 20 MG/1
20 TABLET ORAL DAILY
Qty: 30 TAB | Refills: 3 | Status: SHIPPED | OUTPATIENT
Start: 2017-08-03 | End: 2017-08-08 | Stop reason: ALTCHOICE

## 2017-08-03 NOTE — TELEPHONE ENCOUNTER
From: Radha Sanchez  To: Torey Cormier MD  Sent: 8/2/2017 8:20 AM EDT  Subject: Medication Renewal Request    Original authorizing provider: Torey Cormier MD    Asael Officer  Valencia Jones would like a refill of the following medications:  olmesartan (BENICAR) 20 mg tablet Torey Cormier MD]  glyBURIDE (DIABETA) 5 mg tablet Torey Cormier MD]  insulin detemir (LEVEMIR FLEXTOUCH) 100 unit/mL (3 mL) inpn Torey Cormier MD]  Insulin Needles, Disposable, (NOVOFINE 30) 30 gauge x 1/3\" Torey Cormier MD]  metoprolol succinate (TOPROL-XL) 100 mg tablet Torey Cormier MD]  amLODIPine (NORVASC) 10 mg tablet Torey Cormier MD]  metFORMIN (GLUCOPHAGE) 500 mg tablet Torey Cormier MD]  insulin detemir (LEVEMIR FLEXTOUCH) 100 unit/mL (3 mL) inpn [Maribel Bajwa MD]  valsartan-hydroCHLOROthiazide (DIOVAN-HCT) 320-25 mg per tablet Torey Cormier MD]  insulin aspart (NOVOLOG) 100 unit/mL inpn [Maribel Bajwa MD]  glucose blood VI test strips (ONETOUCH ULTRA TEST) strip Torey Cormier MD]    Preferred pharmacy: [id number]- [place], [place] - [address] [place] [address]    Comment:

## 2017-08-08 ENCOUNTER — TELEPHONE (OUTPATIENT)
Dept: INTERNAL MEDICINE CLINIC | Age: 62
End: 2017-08-08

## 2017-08-08 NOTE — TELEPHONE ENCOUNTER
Contacted pt and advised that medication was discontinued in 2015. She requested refill on August 2 through my chart. Pt upset that it was showing on her my chart service as current. Apologized and advised I would take of medication list again but not sure what will show on her side of my chart.

## 2017-08-08 NOTE — TELEPHONE ENCOUNTER
Patient wants to know if you still want her on olmesartan (BENICAR) 20 mg tablet?  Or not please let her know no is [phone]

## 2017-11-13 DIAGNOSIS — I10 ESSENTIAL HYPERTENSION WITH GOAL BLOOD PRESSURE LESS THAN 140/90: ICD-10-CM

## 2017-11-13 RX ORDER — VALSARTAN AND HYDROCHLOROTHIAZIDE 320; 25 MG/1; MG/1
TABLET, FILM COATED ORAL
Qty: 90 TAB | Refills: 0 | Status: SHIPPED | OUTPATIENT
Start: 2017-11-13 | End: 2017-12-12 | Stop reason: SDUPTHER

## 2017-12-12 ENCOUNTER — OFFICE VISIT (OUTPATIENT)
Dept: INTERNAL MEDICINE CLINIC | Age: 62
End: 2017-12-12

## 2017-12-12 VITALS
HEIGHT: 66 IN | OXYGEN SATURATION: 99 % | DIASTOLIC BLOOD PRESSURE: 70 MMHG | HEART RATE: 70 BPM | WEIGHT: 200 LBS | TEMPERATURE: 98.4 F | SYSTOLIC BLOOD PRESSURE: 140 MMHG | RESPIRATION RATE: 14 BRPM | BODY MASS INDEX: 32.14 KG/M2

## 2017-12-12 DIAGNOSIS — I10 ESSENTIAL HYPERTENSION: ICD-10-CM

## 2017-12-12 DIAGNOSIS — E11.65 TYPE 2 DIABETES MELLITUS WITH HYPERGLYCEMIA, WITH LONG-TERM CURRENT USE OF INSULIN (HCC): Primary | ICD-10-CM

## 2017-12-12 DIAGNOSIS — Z79.4 TYPE 2 DIABETES MELLITUS WITH HYPERGLYCEMIA, WITH LONG-TERM CURRENT USE OF INSULIN (HCC): Primary | ICD-10-CM

## 2017-12-12 DIAGNOSIS — E78.2 MIXED HYPERLIPIDEMIA: ICD-10-CM

## 2017-12-12 RX ORDER — TERAZOSIN 2 MG/1
2 CAPSULE ORAL DAILY
COMMUNITY
Start: 2017-11-17 | End: 2018-04-25 | Stop reason: SDUPTHER

## 2017-12-12 NOTE — PROGRESS NOTES
HISTORY OF PRESENT ILLNESS  Marc Steele is a 57 y.o. female. HPI   Diabetic Review of Systems - medication compliance: compliant all of the time, diabetic diet compliance: compliant most of the time, home glucose monitoring: is not performed, further diabetic ROS: no polyuria or polydipsia, no chest pain, dyspnea or TIA's, no numbness, tingling or pain in extremities. Other symptoms and concerns: Patient reports she followed up with endocrinology last month. She states last A1C is between 9.3-10.5%. Patient continues 130 units of long acting at night. She states she is inconsistent with short acting insulin. Hypertension ROS: taking medications as instructed, no medication side effects noted, no TIA's, no chest pain on exertion, no dyspnea on exertion, no swelling of ankles. New concerns:  Patient's BP in office today is 140/70. Patient continues terazosin. Hyperlipidemia:  Cardiovascular risk analysis - 57 y.o. female LDL goal is under 130. ROS: not taking medications, no TIA's, no chest pain on exertion, no dyspnea on exertion, no swelling of ankles. Tolerating meds, no myalgias or other side effects noted  New concerns: Patient not on medications. Review of Systems   All other systems reviewed and are negative. Physical Exam   Constitutional: She is oriented to person, place, and time. She appears well-developed and well-nourished. HENT:   Head: Normocephalic and atraumatic. Right Ear: External ear normal.   Left Ear: External ear normal.   Nose: Nose normal.   Mouth/Throat: Oropharynx is clear and moist.   Eyes: Conjunctivae and EOM are normal.   Neck: Normal range of motion. Neck supple. Carotid bruit is not present. No thyroid mass and no thyromegaly present. Cardiovascular: Normal rate, regular rhythm, S1 normal, S2 normal, normal heart sounds and intact distal pulses. Pulmonary/Chest: Effort normal and breath sounds normal.   Abdominal: Soft.  Normal appearance and bowel sounds are normal. There is no hepatosplenomegaly. There is no tenderness. Musculoskeletal: Normal range of motion. Neurological: She is alert and oriented to person, place, and time. She has normal strength. No cranial nerve deficit or sensory deficit. Coordination normal.   Skin: Skin is warm, dry and intact. No abrasion and no rash noted. Psychiatric: She has a normal mood and affect. Her behavior is normal. Judgment and thought content normal.   Nursing note and vitals reviewed. ASSESSMENT and PLAN  Diagnoses and all orders for this visit:    1. Type 2 diabetes mellitus with hyperglycemia, with long-term current use of insulin (HCC)  Patient A1C is up. Strongly encouraged patient to adhere to diabetic diet more closely and to facilitate more exercise into routine. 2. Essential hypertension   BP is at goal. I do not recommend any change in medications. 3. Mixed hyperlipidemia  Stable, patient not on medications. I do not recommend any change in medications. lab results and schedule of future lab studies reviewed with patient  reviewed diet, exercise and weight control    Written by Angelia Negron, as dictated by Cherylene Stage, MD.     Current diagnosis and concerns discussed with pt at length. Understands risks and benefits or current treatment plan and medications and accepts the treatment and medication with any possible risks. Pt asks appropriate questions which were answered. Pt instructed to call with any concerns or problems. This note will not be viewable in [address]

## 2017-12-13 LAB
CHOLEST SERPL-MCNC: 121 MG/DL
HDL CHOLESTEROL,HDL: 35 MG/DL
HEMOGLOBIN A1C: 10.8
LDL-CHOLESTEROL: 49 MG/DL
Lab: 47.5 MCG/DL
TRIGLYCERIDES, 001174: 183

## 2018-01-12 ENCOUNTER — TELEPHONE (OUTPATIENT)
Dept: INTERNAL MEDICINE CLINIC | Age: 63
End: 2018-01-12

## 2018-02-06 DIAGNOSIS — I10 ESSENTIAL HYPERTENSION WITH GOAL BLOOD PRESSURE LESS THAN 140/90: ICD-10-CM

## 2018-02-06 RX ORDER — VALSARTAN AND HYDROCHLOROTHIAZIDE 320; 25 MG/1; MG/1
TABLET, FILM COATED ORAL
Qty: 90 TAB | Refills: 0 | Status: SHIPPED | OUTPATIENT
Start: 2018-02-06 | End: 2018-04-25 | Stop reason: SDUPTHER

## 2018-02-22 DIAGNOSIS — E11.9 DIABETES MELLITUS TYPE 2, CONTROLLED (HCC): ICD-10-CM

## 2018-02-22 RX ORDER — GLYBURIDE 5 MG/1
TABLET ORAL
Qty: 120 TAB | Refills: 5 | Status: SHIPPED | OUTPATIENT
Start: 2018-02-22 | End: 2018-04-25 | Stop reason: SDUPTHER

## 2018-04-05 ENCOUNTER — TELEPHONE (OUTPATIENT)
Dept: INTERNAL MEDICINE CLINIC | Age: 63
End: 2018-04-05

## 2018-04-05 NOTE — TELEPHONE ENCOUNTER
Pt received a letter from [facility] that they will not cover glyburide and they need either something in place of it or they will need documentation that the patient needs to take it.   Call pt at [phone]

## 2018-04-11 ENCOUNTER — DOCUMENTATION ONLY (OUTPATIENT)
Dept: INTERNAL MEDICINE CLINIC | Age: 63
End: 2018-04-11

## 2018-04-25 ENCOUNTER — OFFICE VISIT (OUTPATIENT)
Dept: INTERNAL MEDICINE CLINIC | Age: 63
End: 2018-04-25

## 2018-04-25 VITALS
DIASTOLIC BLOOD PRESSURE: 62 MMHG | OXYGEN SATURATION: 98 % | SYSTOLIC BLOOD PRESSURE: 144 MMHG | BODY MASS INDEX: 32.43 KG/M2 | HEART RATE: 64 BPM | HEIGHT: 66 IN | WEIGHT: 201.8 LBS | TEMPERATURE: 99 F | RESPIRATION RATE: 14 BRPM

## 2018-04-25 DIAGNOSIS — Z78.0 POST-MENOPAUSAL: ICD-10-CM

## 2018-04-25 DIAGNOSIS — I10 ESSENTIAL HYPERTENSION WITH GOAL BLOOD PRESSURE LESS THAN 140/90: ICD-10-CM

## 2018-04-25 DIAGNOSIS — E78.2 MIXED HYPERLIPIDEMIA: ICD-10-CM

## 2018-04-25 DIAGNOSIS — Z82.49 FH: HEART DISEASE: ICD-10-CM

## 2018-04-25 DIAGNOSIS — E11.9 CONTROLLED TYPE 2 DIABETES MELLITUS WITHOUT COMPLICATION, UNSPECIFIED WHETHER LONG TERM INSULIN USE (HCC): Primary | ICD-10-CM

## 2018-04-25 RX ORDER — METFORMIN HYDROCHLORIDE 500 MG/1
TABLET ORAL
Qty: 360 TAB | Refills: 1 | Status: SHIPPED | OUTPATIENT
Start: 2018-04-25 | End: 2019-03-06 | Stop reason: SDUPTHER

## 2018-04-25 RX ORDER — METOPROLOL SUCCINATE 100 MG/1
TABLET, EXTENDED RELEASE ORAL
Qty: 90 TAB | Refills: 1 | Status: SHIPPED | OUTPATIENT
Start: 2018-04-25 | End: 2018-10-21 | Stop reason: SDUPTHER

## 2018-04-25 RX ORDER — VALSARTAN AND HYDROCHLOROTHIAZIDE 320; 25 MG/1; MG/1
TABLET, FILM COATED ORAL
Qty: 90 TAB | Refills: 1 | Status: SHIPPED | OUTPATIENT
Start: 2018-04-25 | End: 2018-10-21 | Stop reason: SDUPTHER

## 2018-04-25 RX ORDER — TERAZOSIN 2 MG/1
2 CAPSULE ORAL DAILY
Qty: 90 CAP | Refills: 1 | Status: SHIPPED | OUTPATIENT
Start: 2018-04-25 | End: 2019-06-17 | Stop reason: SDUPTHER

## 2018-04-25 RX ORDER — GLYBURIDE 5 MG/1
TABLET ORAL
Qty: 360 TAB | Refills: 1 | Status: SHIPPED | OUTPATIENT
Start: 2018-04-25 | End: 2018-10-22 | Stop reason: SDUPTHER

## 2018-04-25 RX ORDER — AMLODIPINE BESYLATE 10 MG/1
TABLET ORAL
Qty: 90 TAB | Refills: 1 | Status: SHIPPED | OUTPATIENT
Start: 2018-04-25 | End: 2018-12-24 | Stop reason: SDUPTHER

## 2018-04-25 RX ORDER — INSULIN ASPART 100 [IU]/ML
INJECTION, SOLUTION INTRAVENOUS; SUBCUTANEOUS
Qty: 1 PEN | Refills: 3 | Status: SHIPPED | OUTPATIENT
Start: 2018-04-25 | End: 2021-08-03

## 2018-04-25 NOTE — PROGRESS NOTES
HISTORY OF PRESENT ILLNESS  Kellie Leung is a 70 y.o. female. HPI   Diabetic Review of Systems - medication compliance: compliant most of the time, diabetic diet compliance: compliant most of the time, further diabetic ROS: no polyuria or polydipsia, no chest pain, dyspnea or TIA's, no numbness, tingling or pain in extremities Other symptoms and concerns: She continues 130 units of Levamere at night. Patient states she is not as consistent with short acting insulin. Last A1C was around 9%. She states she is trying to exercise more. Hypertension ROS: taking medications as instructed, no medication side effects noted, no TIA's, no chest pain on exertion, no dyspnea on exertion, no swelling of ankles. New concerns:  Patient's BP in office today is 144/62. Hyperlipidemia:  Cardiovascular risk analysis - 70 y.o. female LDL goal is under 130. ROS: no TIA's, no chest pain on exertion, no dyspnea on exertion, no swelling of ankles. Tolerating meds, no myalgias or other side effects noted  New concerns: She is not on medications at this time. Patient reports her brother has had quadruple bypass. She inquires about having baseline stress test.   Review of Systems   All other systems reviewed and are negative. Physical Exam   Constitutional: She is oriented to person, place, and time. She appears well-developed and well-nourished. HENT:   Head: Normocephalic and atraumatic. Right Ear: External ear normal.   Left Ear: External ear normal.   Nose: Nose normal.   Mouth/Throat: Oropharynx is clear and moist.   Eyes: Conjunctivae and EOM are normal.   Neck: Normal range of motion. Neck supple. Carotid bruit is not present. No thyroid mass and no thyromegaly present. Cardiovascular: Normal rate, regular rhythm, S1 normal, S2 normal, normal heart sounds and intact distal pulses. Pulmonary/Chest: Effort normal and breath sounds normal.   Abdominal: Soft.  Normal appearance and bowel sounds are normal. There is no hepatosplenomegaly. There is no tenderness. Musculoskeletal: Normal range of motion. Neurological: She is alert and oriented to person, place, and time. She has normal strength. No cranial nerve deficit or sensory deficit. Coordination normal.   Skin: Skin is warm, dry and intact. No abrasion and no rash noted. Psychiatric: She has a normal mood and affect. Her behavior is normal. Judgment and thought content normal.   Nursing note and vitals reviewed. ASSESSMENT and PLAN  Diagnoses and all orders for this visit:    1. Controlled type 2 diabetes mellitus without complication, unspecified whether long term insulin use (HCC)  Last A1C was ~9.0%. She will resume exercising regularly and monitoring diet. -     glyBURIDE (DIABETA) 5 mg tablet; TAKE 2 TABLETS BY MOUTH TWICE A DAY  -     insulin detemir U-100 (LEVEMIR FLEXTOUCH U-100 INSULN) 100 unit/mL (3 mL) inpn; INJECT 130 UNITS SUBCUTANEOUSLY DAILY  -     insulin aspart U-100 (NOVOLOG) 100 unit/mL inpn; 20 units at breakfast, 26 units at lunch, 26 units at dinner. ( please give one month)    2. Essential hypertension with goal blood pressure less than 140/90  /62 today in office. I do not recommend any change in medications. -     valsartan-hydroCHLOROthiazide (DIOVAN-HCT) 320-25 mg per tablet; TAKE 1 TABLET BY MOUTH EVERY DAY  -     terazosin (HYTRIN) 2 mg capsule; Take 1 Cap by mouth daily. -     metoprolol succinate (TOPROL-XL) 100 mg tablet; TAKE 1 TABLET BY MOUTH DAILY. -     amLODIPine (NORVASC) 10 mg tablet; TAKE 1 TABLET BY MOUTH EVERY DAY  -     metFORMIN (GLUCOPHAGE) 500 mg tablet; TAKE 2 TABLETS BY MOUTH TWICE A DAY WITH MEALS    3. Mixed hyperlipidemia  Patient not on medications, continue to monitor. 4. FH: heart disease  Patient will follow up with cardiology in next 1-2 months to discuss baseline tests. -     REFERRAL TO CARDIOLOGY    5. Post-menopausal  Patient due for imaging and will follow up.    -     DEXA BONE DENSITY STUDY AXIAL; Future    Lab results and schedule of future lab studies reviewed with patient  reviewed diet, exercise and weight control    Written by Brittany Hoffman, as dictated by Paula Delgadillo MD.     Current diagnosis and concerns discussed with pt at length. Understands risks and benefits or current treatment plan and medications and accepts the treatment and medication with any possible risks. Pt asks appropriate questions which were answered. Pt instructed to call with any concerns or problems.

## 2018-05-03 ENCOUNTER — TELEPHONE (OUTPATIENT)
Dept: INTERNAL MEDICINE CLINIC | Age: 63
End: 2018-05-03

## 2018-05-03 NOTE — TELEPHONE ENCOUNTER
Pt wants to know if there is a substitute for Novalog. With pt's insurance it will be over $400 a month. [phone].

## 2018-05-04 NOTE — TELEPHONE ENCOUNTER
L/vm to request pt contact insurance to verify cost of comparable medication and then return call to discuss utilizing alternative medication.

## 2018-05-07 ENCOUNTER — TELEPHONE (OUTPATIENT)
Dept: INTERNAL MEDICINE CLINIC | Age: 63
End: 2018-05-07

## 2018-05-07 NOTE — TELEPHONE ENCOUNTER
We need to check with her endocrinologist as none of those meds are short acting insulin - what do they suggest or call pharmacist as that list does not make sense - those are all long acting insulins

## 2018-05-07 NOTE — TELEPHONE ENCOUNTER
Pt call in returning call from nurse. The medications given to her that are covered to substitute the novolog short term insulin would are :   1. Toujeo  2. Lantus  3. Levimere  4.  Neville Christensen    She gave me the least starting with the cheapest.  You may call her if needed at [phone]

## 2018-05-10 ENCOUNTER — HOSPITAL ENCOUNTER (OUTPATIENT)
Dept: MAMMOGRAPHY | Age: 63
Discharge: HOME | End: 2018-05-10
Attending: INTERNAL MEDICINE
Payer: MEDICARE

## 2018-05-10 DIAGNOSIS — Z78.0 POST-MENOPAUSAL: ICD-10-CM

## 2018-05-10 PROCEDURE — 77080 DXA BONE DENSITY AXIAL: CPT

## 2018-05-10 NOTE — TELEPHONE ENCOUNTER
Contacted pt and inquired if she had enough insulin to last until pharmacist return to office next week. Pt advised she did and will be fine to wait until return call for assistance.

## 2018-05-10 NOTE — TELEPHONE ENCOUNTER
----- Message from Romilda Flavors sent at 5/10/2018  8:53 AM EDT -----  Regarding: /Telephone  Pt missed a call from [place] and will like a call back.  P [phone]

## 2018-05-17 NOTE — TELEPHONE ENCOUNTER
Spoke to patient today for a while about her medicare plan. Patient is new to medicare and just figuring things out. Patient sees endocrinologist Adma White. We discussed that changing to a different rapid acting would likely cost just as much being that she's in the gap. I let her know that she can expect for her Levemir to likely be really expensive the next time she fills it. Patient is  and her  still works full time, she still works part time and receives about 800 a month of social security income. I don't think she would be eligible for additional assistance from drug company or low income subsidy based off of this information. Discussed the option of NPH/Regular insulin vials being available from [employer] for ~$26 per vial. Patient has an appointment with Dr. Debra Chung in 3 weeks. Advised her to call them and see what they recommend - possibly giving her samples until she comes in and can discuss changing to cheaper alternative. Patient has my information and will call me back with any questions or concerns.     Phill Lantigua, PharmD, BCPS, CDE

## 2018-07-05 ENCOUNTER — OFFICE VISIT (OUTPATIENT)
Dept: CARDIOLOGY CLINIC | Age: 63
End: 2018-07-05

## 2018-07-05 VITALS
BODY MASS INDEX: 32.62 KG/M2 | OXYGEN SATURATION: 99 % | HEIGHT: 66 IN | WEIGHT: 203 LBS | DIASTOLIC BLOOD PRESSURE: 70 MMHG | SYSTOLIC BLOOD PRESSURE: 140 MMHG | HEART RATE: 72 BPM | RESPIRATION RATE: 16 BRPM

## 2018-07-05 DIAGNOSIS — I10 BENIGN ESSENTIAL HTN: ICD-10-CM

## 2018-07-05 DIAGNOSIS — E78.49 OTHER HYPERLIPIDEMIA: Primary | ICD-10-CM

## 2018-07-05 DIAGNOSIS — Z82.49 FH ISCHEMIC HEART DISEASE: ICD-10-CM

## 2018-07-05 RX ORDER — METOPROLOL SUCCINATE 200 MG/1
TABLET, EXTENDED RELEASE ORAL
Refills: 4 | COMMUNITY
Start: 2018-04-16 | End: 2018-12-17 | Stop reason: SDUPTHER

## 2018-07-05 RX ORDER — PNEUMOCOCCAL 13-VALENT CONJUGATE VACCINE 2.2; 2.2; 2.2; 2.2; 2.2; 4.4; 2.2; 2.2; 2.2; 2.2; 2.2; 2.2; 2.2 UG/.5ML; UG/.5ML; UG/.5ML; UG/.5ML; UG/.5ML; UG/.5ML; UG/.5ML; UG/.5ML; UG/.5ML; UG/.5ML; UG/.5ML; UG/.5ML; UG/.5ML
INJECTION, SUSPENSION INTRAMUSCULAR
Refills: 0 | COMMUNITY
Start: 2018-05-10 | End: 2018-12-17 | Stop reason: ALTCHOICE

## 2018-07-05 RX ORDER — GUAIFENESIN 100 MG/5ML
81 LIQUID (ML) ORAL DAILY
Qty: 30 TAB | Refills: 0 | Status: SHIPPED | OUTPATIENT
Start: 2018-07-05 | End: 2018-08-07 | Stop reason: SDUPTHER

## 2018-07-05 RX ORDER — ATORVASTATIN CALCIUM 10 MG/1
TABLET, FILM COATED ORAL
Refills: 11 | COMMUNITY
Start: 2018-06-26 | End: 2018-08-08 | Stop reason: SDUPTHER

## 2018-07-05 NOTE — PROGRESS NOTES
All health maintenance and other pertinent information has been reviewed in preparation for today's office visit. Patient presents in the office today for:    Chief Complaint   Patient presents with    New Patient     HTN (hypertension); Hyperlipdemia     1. Have you been to the ER, urgent care clinic since your last visit? Hospitalized since your last visit? No    2. Have you seen or consulted any other health care providers outside of the [address] [place] [address] since your last visit? Include any pap smears or colon screening.  No    Visit Vitals    /70 (BP 1 Location: Right arm, BP Patient Position: Sitting)    Pulse 72    Resp 16    Ht 5' 6\" (1.676 m)    Wt 203 lb (92.1 kg)    LMP 01/01/1995    SpO2 99%    BMI 32.77 kg/m2

## 2018-07-05 NOTE — PROGRESS NOTES
Aj Degroot MD. [facility]              Patient: Christiano Johnson  : 1955      Today's Date: 2018            HISTORY OF PRESENT ILLNESS:     History of Present Illness:  She is here for cardiac risk assessment. Is here at the request of her brother, who recently had a heart attack at 77 y/o. Otherwise, FH noticeable for father with CABG and heart transplant; passed away at 77 y/o. Denies chest pain, SOB, LOMBARDI, palpitations, abdominal pain, nausea/ vomiting, lower extremity swelling. She has no complaints. Currently on Lipitor; last cholesterol labs drawn 2017. Total 121, HDL 35, LDL 49        PAST MEDICAL HISTORY:     Past Medical History:   Diagnosis Date    Diabetes ([facility] [address]     FH ischemic heart disease     brother with heart disease     Hyperlipidemia     Hypertension          Past Surgical History:   Procedure Laterality Date    ENDOSCOPY, COLON, DIAGNOSTIC       normal    HX APPENDECTOMY      HX GYN      molar pregnancy    HX GYN      CHIQUI part OOphorectomy       Family History   Problem Relation Age of Onset    Cancer Mother      breast in her 62s    Breast Cancer Mother 61    Heart Failure Father      after heart transplant    Hypertension Brother     Heart Attack Brother     Hypertension Brother         MEDICATIONS:     Current Outpatient Prescriptions   Medication Sig Dispense Refill    atorvastatin (LIPITOR) 10 mg tablet TAKE 1 TABLET BY MOUTH ONCE A DAY  11    dulaglutide (TRULICITY SC) by SubCUTAneous route every seven (7) days.  glyBURIDE (DIABETA) 5 mg tablet TAKE 2 TABLETS BY MOUTH TWICE A  Tab 1    valsartan-hydroCHLOROthiazide (DIOVAN-HCT) 320-25 mg per tablet TAKE 1 TABLET BY MOUTH EVERY DAY 90 Tab 1    terazosin (HYTRIN) 2 mg capsule Take 1 Cap by mouth daily.  90 Cap 1    insulin detemir U-100 (LEVEMIR FLEXTOUCH U-100 INSULN) 100 unit/mL (3 mL) inpn INJECT 130 UNITS SUBCUTANEOUSLY DAILY 45 Pen 3    metoprolol succinate (TOPROL-XL) 100 mg tablet TAKE 1 TABLET BY MOUTH DAILY. 90 Tab 1    amLODIPine (NORVASC) 10 mg tablet TAKE 1 TABLET BY MOUTH EVERY DAY 90 Tab 1    metFORMIN (GLUCOPHAGE) 500 mg tablet TAKE 2 TABLETS BY MOUTH TWICE A DAY WITH MEALS 360 Tab 1    Insulin Needles, Disposable, (NOVOFINE 30) 30 gauge x 1/3\" USE TO INJECT INSULIN 6-8 UNITS PRIOR TO MEALS 100 Pen Needle 2    glucose blood VI test strips (ONETOUCH ULTRA TEST) strip Pt to test blood sugars three times daily E11.9 100 Strip 2    metoprolol succinate (TOPROL-XL) 200 mg XL tablet TAKE 1 TABLET BY MOUTH EVERY DAY  4    PREVNAR 13, PF, 0.5 mL syrg injection TO BE ADMINISTERED BY PHARMACIST FOR IMMUNIZATION  0    insulin aspart U-100 (NOVOLOG) 100 unit/mL inpn 20 units at breakfast, 26 units at lunch, 26 units at dinner. ( please give one month) 1 Pen 3    insulin detemir (LEVEMIR FLEXTOUCH) 100 unit/mL (3 mL) inpn 120 Units by SubCUTAneous route daily for 30 days. Please provide a 90 day supply 3 Pen 1    LEVEMIR FLEXTOUCH 100 unit/mL (3 mL) inpn INJECT 120 UNITS SUBCUTANEOUSLY DAILY 45 Pen 3       No Known Allergies          SOCIAL HISTORY:     Social History   Substance Use Topics    Smoking status: Never Smoker    Smokeless tobacco: Never Used    Alcohol use No         FAMILY HISTORY:     Family History   Problem Relation Age of Onset    Cancer Mother      breast in her 62s    Breast Cancer Mother 61    Heart Failure Father      after heart transplant    Hypertension Brother     Heart Attack Brother     Hypertension Brother              REVIEW OF SYMPTOMS:     Review of Symptoms:  Constitutional: Negative for fever, chills  HEENT: Negative for nosebleeds, tinnitus, and vision changes. Respiratory: Negative for cough, wheezing  Cardiovascular: Negative for orthopnea, claudication, syncope, and PND. Gastrointestinal: Negative for abdominal pain, diarrhea, melena. Genitourinary: Negative for dysuria  Musculoskeletal: Negative for myalgias.    Skin: Negative for rash  Heme: No problems bleeding. Neurological: Negative for speech change and focal weakness. PHYSICAL EXAM:     Physical Exam:  Visit Vitals    /70 (BP 1 Location: Right arm, BP Patient Position: Sitting)    Pulse 72    Resp 16    Ht 5' 6\" (1.676 m)    Wt 203 lb (92.1 kg)    LMP 01/01/1995    SpO2 99%    BMI 32.77 kg/m2     Patient appears generally well, mood and affect are appropriate and pleasant. HEENT:  Hearing intact, non-icteric, normocephalic, atraumatic. Neck Exam: Supple, No JVD or carotid bruits. Lung Exam: Clear to auscultation, even breath sounds. Cardiac Exam: Regular rate and rhythm with no murmur  Abdomen: Soft, non-tender, normal bowel sounds. No bruits or masses. Extremities: Moves all ext well. No lower extremity edema. Vascular: 2+ dorsalis pedis pulses bilaterally. Psych: Appropriate affect  Neuro - Grossly intact        LABS / OTHER STUDIES:       Lab Results   Component Value Date/Time    Sodium 136 12/16/2013 09:39 AM    Potassium 4.0 12/16/2013 09:39 AM    Chloride 97 12/16/2013 09:39 AM    CO2 26 12/16/2013 09:39 AM    Glucose 153 (H) 12/16/2013 09:39 AM    BUN 12 12/16/2013 09:39 AM    Creatinine 0.61 12/16/2013 09:39 AM    BUN/Creatinine ratio 20 12/16/2013 09:39 AM    GFR est  12/16/2013 09:39 AM    GFR est non-AA 99 12/16/2013 09:39 AM    Calcium 9.7 12/16/2013 09:39 AM    Bilirubin, total 0.5 12/16/2013 09:39 AM    AST (SGOT) 24 12/16/2013 09:39 AM    Alk.  phosphatase 83 12/16/2013 09:39 AM    Protein, total 7.1 12/16/2013 09:39 AM    Albumin 4.2 12/16/2013 09:39 AM    A-G Ratio 1.4 12/16/2013 09:39 AM    ALT (SGPT) 35 (H) 12/16/2013 09:39 AM       Lab Results   Component Value Date/Time    Cholesterol, total 121 11/16/2017    HDL Cholesterol 35 11/16/2017    HDL Cholesterol 33 (L) 12/16/2013 09:39 AM    LDL Cholesterol 101 07/29/2015    LDL-CHOLESTEROL 49 11/16/2017    LDL, calculated 112 (H) 12/16/2013 09:39 AM    VLDL, calculated 40 12/16/2013 09:39 AM    Triglyceride 198 (H) 12/16/2013 09:39 AM    Triglycerides 183 11/16/2017     Lab Results   Component Value Date/Time    Hemoglobin A1c 10.8 11/16/2017    Hemoglobin A1c 10.0 (H) 12/16/2013 09:39 AM    Hemoglobin A1c (POC) 9.6 03/04/2013 10:19 AM         Will get labs from Endocrinologist (Dr. Severiano Sierra)         CARDIAC DIAGNOSTICS:     Cardiac Evaluation Includes:  EKG 7/5/18 - NSR, non-specific T wave abn         ASSESSMENT AND PLAN:     Assessment and Plan:  1) CV risk assessment and management   - Her 10 year ASCVD risk is 15%  - Her brother recently was found to have heart disease and his cardiologist recommended that family get a CT heart Scan  - Ms. Shayan Cooper is already at high risk based on risk factors and needs good risk factor control. Info given on a CT heart scan which will provide further risk assessment.   - Consider a stress test if she has a high CAC score on the CT heart scan. - cont statin  - recommend ASA 81 mg daily    2) Dyslipidemia  - recent lipids OK   - cont statin     3) DM  - managed by Dr. Severiano Sierra (Endocrine)     4) HTN  - BP usually OK   - goal < 130/80  - cont meds    5) Phone FU after testing. Consider a stress test if she has a high CAC score. Patient expressed understanding of the plan - questions were answered. She is from [facility]. [facility] [address] [facility] for 15[facility] years. Jean Monroy MD, [address], [facility] [address] [place], [address] [place] [address]  Ph: [phone]   Ph [phone]        ADDENDUM   8/8/2018  CT Heart Scan 8/2/18 - score of 562 is at the 90th percentile rank. Reviewed results with patient. Increase atorvasttain to 20 mg daily (more potent dose). Will check an exercise cardiolite (2 day study) to rule out obstructive disease. Will see her back in 6 months.

## 2018-07-10 ENCOUNTER — HOSPITAL ENCOUNTER (OUTPATIENT)
Dept: MAMMOGRAPHY | Age: 63
Discharge: HOME | End: 2018-07-10
Attending: INTERNAL MEDICINE
Payer: MEDICARE

## 2018-07-10 DIAGNOSIS — Z12.39 SCREENING BREAST EXAMINATION: ICD-10-CM

## 2018-07-10 PROCEDURE — 77067 SCR MAMMO BI INCL CAD: CPT

## 2018-08-02 ENCOUNTER — HOSPITAL ENCOUNTER (OUTPATIENT)
Dept: CT IMAGING | Age: 63
Discharge: HOME | End: 2018-08-02
Attending: INTERNAL MEDICINE
Payer: SELF-PAY

## 2018-08-02 DIAGNOSIS — Z00.00 PREVENTATIVE HEALTH CARE: ICD-10-CM

## 2018-08-02 PROCEDURE — 75571 CT HRT W/O DYE W/CA TEST: CPT

## 2018-08-07 DIAGNOSIS — Z82.49 FH ISCHEMIC HEART DISEASE: Primary | ICD-10-CM

## 2018-08-07 RX ORDER — GUAIFENESIN 100 MG/5ML
81 LIQUID (ML) ORAL DAILY
Qty: 90 TAB | Refills: 3 | Status: SHIPPED | OUTPATIENT
Start: 2018-08-07 | End: 2019-05-26 | Stop reason: SDUPTHER

## 2018-08-08 RX ORDER — ATORVASTATIN CALCIUM 20 MG/1
20 TABLET, FILM COATED ORAL DAILY
Qty: 30 TAB | Refills: 12 | Status: SHIPPED | OUTPATIENT
Start: 2018-08-08 | End: 2019-06-17 | Stop reason: SDUPTHER

## 2018-08-23 ENCOUNTER — CLINICAL SUPPORT (OUTPATIENT)
Dept: CARDIOLOGY CLINIC | Age: 63
End: 2018-08-23

## 2018-08-23 DIAGNOSIS — R93.1 AGATSTON CAC SCORE, >400: Primary | ICD-10-CM

## 2018-08-23 DIAGNOSIS — Z82.49 FH ISCHEMIC HEART DISEASE: ICD-10-CM

## 2018-08-23 DIAGNOSIS — E78.2 MIXED HYPERLIPIDEMIA: ICD-10-CM

## 2018-08-23 DIAGNOSIS — I10 ESSENTIAL HYPERTENSION: ICD-10-CM

## 2018-08-23 NOTE — PROGRESS NOTES
See scanned report. Dr. Anamaria Barbour ordered study and Dr. Anamaria Barbour read study. ID verified per protocol. Explained procedure and risks to patient. All concerns and questions ansewered prior to obtaining consent test. Patient developed dyspnea during test. At 3 minutes in recovery, patient without symptoms or complaints voiced.

## 2018-10-08 LAB — HEMOGLOBIN A1C: 10.9 %

## 2018-10-22 DIAGNOSIS — E11.9 CONTROLLED TYPE 2 DIABETES MELLITUS WITHOUT COMPLICATION, UNSPECIFIED WHETHER LONG TERM INSULIN USE (HCC): ICD-10-CM

## 2018-10-22 RX ORDER — GLYBURIDE 5 MG/1
TABLET ORAL
Qty: 360 TAB | Refills: 1 | Status: SHIPPED | OUTPATIENT
Start: 2018-10-22 | End: 2019-04-15 | Stop reason: SDUPTHER

## 2018-12-17 ENCOUNTER — OFFICE VISIT (OUTPATIENT)
Dept: INTERNAL MEDICINE CLINIC | Age: 63
End: 2018-12-17

## 2018-12-17 VITALS
TEMPERATURE: 98 F | HEIGHT: 66 IN | DIASTOLIC BLOOD PRESSURE: 72 MMHG | OXYGEN SATURATION: 97 % | SYSTOLIC BLOOD PRESSURE: 143 MMHG | RESPIRATION RATE: 14 BRPM | WEIGHT: 219.2 LBS | HEART RATE: 66 BPM | BODY MASS INDEX: 35.23 KG/M2

## 2018-12-17 DIAGNOSIS — G89.29 CHRONIC BILATERAL LOW BACK PAIN WITHOUT SCIATICA: ICD-10-CM

## 2018-12-17 DIAGNOSIS — Z79.4 TYPE 2 DIABETES MELLITUS WITH HYPERGLYCEMIA, WITH LONG-TERM CURRENT USE OF INSULIN (HCC): ICD-10-CM

## 2018-12-17 DIAGNOSIS — Z00.00 WELCOME TO MEDICARE PREVENTIVE VISIT: Primary | ICD-10-CM

## 2018-12-17 DIAGNOSIS — I10 ESSENTIAL HYPERTENSION: ICD-10-CM

## 2018-12-17 DIAGNOSIS — M54.50 CHRONIC BILATERAL LOW BACK PAIN WITHOUT SCIATICA: ICD-10-CM

## 2018-12-17 DIAGNOSIS — E11.65 TYPE 2 DIABETES MELLITUS WITH HYPERGLYCEMIA, WITH LONG-TERM CURRENT USE OF INSULIN (HCC): ICD-10-CM

## 2018-12-17 DIAGNOSIS — Z01.810 PREOP CARDIOVASCULAR EXAM: ICD-10-CM

## 2018-12-17 DIAGNOSIS — E78.2 MIXED HYPERLIPIDEMIA: ICD-10-CM

## 2018-12-17 PROBLEM — E66.01 SEVERE OBESITY (HCC): Status: ACTIVE | Noted: 2018-12-17

## 2018-12-17 NOTE — PROGRESS NOTES
Are appropriate based on today's review and evaluation  This is a \"Welcome to [facility]"  Initial Preventive Physical Examination (IPPE) providing Personalized Prevention Plan Services (Performed in the first 12 months of enrollment)    I have reviewed the patient's medical history in detail and updated the computerized patient record. History     Past Medical History:   Diagnosis Date    Diabetes ([address]     FH ischemic heart disease     brother with heart disease     Hyperlipidemia     Hypertension       Past Surgical History:   Procedure Laterality Date    ENDOSCOPY, COLON, DIAGNOSTIC      2002 normal    HX APPENDECTOMY      HX BREAST BIOPSY Left 06/2017    neg; stereotactic bx    HX GYN      molar pregnancy    HX GYN      CHIQUI part OOphorectomy     Current Outpatient Medications   Medication Sig Dispense Refill    glyBURIDE (DIABETA) 5 mg tablet TAKE 2 TABLETS BY MOUTH TWICE A  Tab 1    valsartan-hydroCHLOROthiazide (DIOVAN-HCT) 320-25 mg per tablet TAKE 1 TABLET BY MOUTH EVERY DAY 90 Tab 0    metoprolol succinate (TOPROL-XL) 100 mg tablet TAKE 1 TABLET BY MOUTH DAILY. 90 Tab 0    atorvastatin (LIPITOR) 20 mg tablet Take 1 Tab by mouth daily. 30 Tab 12    aspirin 81 mg chewable tablet Take 1 Tab by mouth daily. 90 Tab 3    dulaglutide (TRULICITY SC) by SubCUTAneous route every seven (7) days.  terazosin (HYTRIN) 2 mg capsule Take 1 Cap by mouth daily. 90 Cap 1    insulin detemir U-100 (LEVEMIR FLEXTOUCH U-100 INSULN) 100 unit/mL (3 mL) inpn INJECT 130 UNITS SUBCUTANEOUSLY DAILY 45 Pen 3    amLODIPine (NORVASC) 10 mg tablet TAKE 1 TABLET BY MOUTH EVERY DAY 90 Tab 1    metFORMIN (GLUCOPHAGE) 500 mg tablet TAKE 2 TABLETS BY MOUTH TWICE A DAY WITH MEALS 360 Tab 1    insulin aspart U-100 (NOVOLOG) 100 unit/mL inpn 20 units at breakfast, 26 units at lunch, 26 units at dinner.  ( please give one month) 1 Pen 3    Insulin Needles, Disposable, (NOVOFINE 30) 30 gauge x 1/3\" USE TO INJECT INSULIN 6-8 UNITS PRIOR TO MEALS 100 Pen Needle 2    glucose blood VI test strips (ONETOUCH ULTRA TEST) strip Pt to test blood sugars three times daily E11.9 100 Strip 2    LEVEMIR FLEXTOUCH 100 unit/mL (3 mL) inpn INJECT 120 UNITS SUBCUTANEOUSLY DAILY 45 Pen 3    insulin detemir (LEVEMIR FLEXTOUCH) 100 unit/mL (3 mL) inpn 120 Units by SubCUTAneous route daily for 30 days. Please provide a 90 day supply 3 Pen 1     No Known Allergies  Family History   Problem Relation Age of Onset    Cancer Mother         breast in her 62s    Breast Cancer Mother 61    Heart Failure Father         after heart transplant    Hypertension Brother     Heart Attack Brother     Hypertension Brother      Social History     Tobacco Use    Smoking status: Never Smoker    Smokeless tobacco: Never Used   Substance Use Topics    Alcohol use: No     Diet, Lifestyle: No special diet    Exercise level: moderately active    Depression Risk Screen     PHQ over the last two weeks 7/5/2018   Little interest or pleasure in doing things Not at all   Feeling down, depressed, irritable, or hopeless Not at all   Total Score PHQ 2 0     Alcohol Risk Screen   You do not drink alcohol or very rarely. Functional Ability and Level of Safety   Hearing Loss  Hearing is good. Vision Screening  Vision is good. No exam data present      Activities of Daily Living  The home contains: no safety equipment. Patient does total self care    Fall Risk Screen  Fall Risk Assessment, last 12 mths 7/5/2018   Able to walk? Yes   Fall in past 12 months? No       Abuse Screen  Patient is not abused    Screening EKG   EKG order placed: No    Patient Care Team   Patient Care Team:  Carl Ennis MD as PCP - Lev Pittman MD (Breast Surgery)  Betsy Ramos MD (Cardiology)     End of Life Planning   Advanced care planning directives were discussed with the patient and /or family/caregiver.      Assessment/Plan   Education and counseling provided:  Are appropriate based on today's review and evaluation  End-of-Life planning (with patient's consent)      This note will not be viewable in [address]             Health Maintenance Due   Topic Date Due    Shingrix Vaccine Age 49> (1 of 2) 02/22/2003    Pneumococcal 65+ Low/Medium Risk (1 of 2 - PCV13) 02/22/2018    MEDICARE YEARLY EXAM  04/25/2018    Influenza Age 9 to Adult  08/01/2018    MICROALBUMIN Q1  11/16/2018    LIPID PANEL Q1  11/16/2018    HEMOGLOBIN A1C Q6M  01/01/2019

## 2018-12-17 NOTE — PATIENT INSTRUCTIONS
Medicare Wellness Visit, Female     The best way to live healthy is to have a lifestyle where you eat a well-balanced diet, exercise regularly, limit alcohol use, and quit all forms of tobacco/nicotine, if applicable. Regular preventive services are another way to keep healthy. Preventive services (vaccines, screening tests, monitoring & exams) can help personalize your care plan, which helps you manage your own care. Screening tests can find health problems at the earliest stages, when they are easiest to treat. [facility] follows the current, evidence-based guidelines published by the [facility] ([facility]) when recommending preventive services for our patients. Because we follow these guidelines, sometimes recommendations change over time as research supports it. (For example, mammograms used to be recommended annually. Even though Medicare will still pay for an annual mammogram, the newer guidelines recommend a mammogram every two years for women of average risk.)  Of course, you and your doctor may decide to screen more often for some diseases, based on your risk and your health status. Preventive services for you include:  - Medicare offers their members a free annual wellness visit, which is time for you and your primary care provider to discuss and plan for your preventive service needs. Take advantage of this benefit every year!  -All adults over the age of 72 should receive the recommended pneumonia vaccines. Current USPSTF guidelines recommend a series of two vaccines for the best pneumonia protection.   -All adults should have a flu vaccine yearly and a tetanus vaccine every 10 years. All adults age 61 and older should receive a shingles vaccine once in their lifetime.    -A bone mass density test is recommended when a woman turns 65 to screen for osteoporosis. This test is only recommended one time, as a screening.  Some providers will use this same test as a disease monitoring tool if you already have osteoporosis. -All adults age 38-68 who are overweight should have a diabetes screening test once every three years.   -Other screening tests and preventive services for persons with diabetes include: an eye exam to screen for diabetic retinopathy, a kidney function test, a foot exam, and stricter control over your cholesterol.   -Cardiovascular screening for adults with routine risk involves an electrocardiogram (ECG) at intervals determined by your doctor.   -Colorectal cancer screenings should be done for adults age 54-65 with no increased risk factors for colorectal cancer. There are a number of acceptable methods of screening for this type of cancer. Each test has its own benefits and drawbacks. Discuss with your doctor what is most appropriate for you during your annual wellness visit. The different tests include: colonoscopy (considered the best screening method), a fecal occult blood test, a fecal DNA test, and sigmoidoscopy. -Breast cancer screenings are recommended every other year for women of normal risk, age 54-69.  -Cervical cancer screenings for women over age 72 are only recommended with certain risk factors.   -All adults born between [facility] should be screened once for Hepatitis C. Here is a list of your current Health Maintenance items (your personalized list of preventive services) with a due date:  Health Maintenance Due   Topic Date Due    Shingles Vaccine (1 of 2) 02/22/2003    Pneumococcal Vaccine (1 of 2 - PCV13) 02/22/2018    Annual Well Visit  04/25/2018    Flu Vaccine  08/01/2018    Albumin Urine Test  11/16/2018    Cholesterol Test   11/16/2018    Hemoglobin A1C    01/01/2019         Medicare Wellness Visit, Female     The best way to live healthy is to have a lifestyle where you eat a well-balanced diet, exercise regularly, limit alcohol use, and quit all forms of tobacco/nicotine, if applicable.    Regular preventive services are another way to keep healthy. Preventive services (vaccines, screening tests, monitoring & exams) can help personalize your care plan, which helps you manage your own care. Screening tests can find health problems at the earliest stages, when they are easiest to treat. [facility] follows the current, evidence-based guidelines published by the [facility] ([facility]STF) when recommending preventive services for our patients. Because we follow these guidelines, sometimes recommendations change over time as research supports it. (For example, mammograms used to be recommended annually. Even though Medicare will still pay for an annual mammogram, the newer guidelines recommend a mammogram every two years for women of average risk.)  Of course, you and your doctor may decide to screen more often for some diseases, based on your risk and your health status. Preventive services for you include:  - Medicare offers their members a free annual wellness visit, which is time for you and your primary care provider to discuss and plan for your preventive service needs. Take advantage of this benefit every year!  -All adults over the age of 72 should receive the recommended pneumonia vaccines. Current USPSTF guidelines recommend a series of two vaccines for the best pneumonia protection.   -All adults should have a flu vaccine yearly and a tetanus vaccine every 10 years. All adults age [address] and older should receive a shingles vaccine once in their lifetime.    -A bone mass density test is recommended when a woman turns 65 to screen for osteoporosis. This test is only recommended one time, as a screening. Some providers will use this same test as a disease monitoring tool if you already have osteoporosis.   -All adults age 38-68 who are overweight should have a diabetes screening test once every three years.   -Other screening tests and preventive services for persons with diabetes include: an eye exam to screen for diabetic retinopathy, a kidney function test, a foot exam, and stricter control over your cholesterol.   -Cardiovascular screening for adults with routine risk involves an electrocardiogram (ECG) at intervals determined by your doctor.   -Colorectal cancer screenings should be done for adults age 54-65 with no increased risk factors for colorectal cancer. There are a number of acceptable methods of screening for this type of cancer. Each test has its own benefits and drawbacks. Discuss with your doctor what is most appropriate for you during your annual wellness visit. The different tests include: colonoscopy (considered the best screening method), a fecal occult blood test, a fecal DNA test, and sigmoidoscopy. -Breast cancer screenings are recommended every other year for women of normal risk, age 54-69.  -Cervical cancer screenings for women over age 72 are only recommended with certain risk factors.   -All adults born between [facility][1] should be screened once for Hepatitis C.      Here is a list of your current Health Maintenance items (your personalized list of preventive services) with a due date:  Health Maintenance Due   Topic Date Due    Shingles Vaccine (1 of 2) 02/22/2003    Pneumococcal Vaccine (1 of 2 - PCV13) 02/22/2018    Annual Well Visit  04/25/2018    Flu Vaccine  08/01/2018    Albumin Urine Test  11/16/2018    Cholesterol Test   11/16/2018    Hemoglobin A1C    01/01/2019

## 2018-12-17 NOTE — PROGRESS NOTES
HISTORY OF PRESENT ILLNESS  Carroll Cates is a 70 y.o. female. HPI     Right eye: Pt reports to clinic for consultation before cataract surgery in right eye, which is scheduled for January 3, 2018 with Dr. Isrrael Cortés.   Hypertension ROS: taking medications as instructed, no medication side effects noted, no TIA's, no chest pain on exertion, no dyspnea on exertion, no swelling of ankles. New concerns:  Patient's BP in office today is 143/72. She has not been working out regularly because of persistent back pain. Diabetic Review of Systems - further diabetic ROS: no polyuria or polydipsia, no chest pain, dyspnea or TIA's, no numbness, tingling or pain in extremities. Other symptoms and concerns: She reports her last A1c in October by Dr. Emanuel Gilford, was under 7. Hyperlipidemia:  Cardiovascular risk analysis - 70 y.o. female LDL goal is under 100. ROS: taking medications as instructed, no medication side effects noted, no TIA's, no chest pain on exertion, no dyspnea on exertion, no swelling of ankles. Tolerating meds, no myalgias or other side effects noted. New concerns: Last LDL on 12/16/13 was 112. Back Pain: Pt reports lower back pain, which has been ongoing for 6 months. Pain is worse when walking and standing. Additionally, she reports SOB. Her last stress test on 8/23/18 was normal.     Review of Systems   All other systems reviewed and are negative. Physical Exam   Constitutional: She is oriented to person, place, and time. She appears well-developed and well-nourished. HENT:   Head: Normocephalic and atraumatic. Right Ear: External ear normal.   Left Ear: External ear normal.   Nose: Nose normal.   Mouth/Throat: Oropharynx is clear and moist.   Eyes: Conjunctivae and EOM are normal.   Neck: Normal range of motion. Neck supple. Carotid bruit is not present. No thyroid mass and no thyromegaly present.    Cardiovascular: Normal rate, regular rhythm, S1 normal, S2 normal, normal heart sounds and intact distal pulses. Pulmonary/Chest: Effort normal and breath sounds normal.   Abdominal: Soft. Normal appearance and bowel sounds are normal. There is no hepatosplenomegaly. There is no tenderness. Musculoskeletal: Normal range of motion. Lower back pain in perispinal area   Neurological: She is alert and oriented to person, place, and time. She has normal strength. No cranial nerve deficit or sensory deficit. Coordination normal.   Skin: Skin is warm, dry and intact. No abrasion and no rash noted. Psychiatric: She has a normal mood and affect. Her behavior is normal. Judgment and thought content normal.   Nursing note and vitals reviewed. ASSESSMENT and PLAN  Diagnoses and all orders for this visit:    1. Welcome to Medicare preventive visit    2. Type 2 diabetes mellitus with hyperglycemia, with long-term current use of insulin (HCC)  Sugars stable. Continue to follow diabetic diet and monitor sugars. 3. Essential hypertension  BP is slightly elevated. I do not recommend any change in medications. 4. Mixed hyperlipidemia  Stable, patient tolerating meds, no myalgias. I do not recommend any change in medications. 5. Preop cardiovascular exam  Pt is cleared for surgery. 6. Chronic bilateral low back pain without sciatica  I have prescribed physical therapy, specifically for core strengthening, to relieve back pain. This note will not be viewable in [address]         Written by Frida Montoya, as dictated by Hugh Saavedra MD.

## 2018-12-24 DIAGNOSIS — I10 ESSENTIAL HYPERTENSION WITH GOAL BLOOD PRESSURE LESS THAN 140/90: ICD-10-CM

## 2018-12-24 RX ORDER — AMLODIPINE BESYLATE 10 MG/1
TABLET ORAL
Qty: 90 TAB | Refills: 1 | Status: SHIPPED | OUTPATIENT
Start: 2018-12-24 | End: 2019-04-28 | Stop reason: SDUPTHER

## 2019-01-02 ENCOUNTER — TELEPHONE (OUTPATIENT)
Dept: INTERNAL MEDICINE CLINIC | Age: 64
End: 2019-01-02

## 2019-01-02 RX ORDER — CYCLOBENZAPRINE HCL 10 MG
10 TABLET ORAL
Qty: 30 TAB | Refills: 1 | Status: SHIPPED | OUTPATIENT
Start: 2019-01-02 | End: 2019-03-04

## 2019-01-02 NOTE — TELEPHONE ENCOUNTER
Pt call in says her back pain is really bad and would like Dr Isadora Steele to send in script for the muscle relaxer that she prescribed a couple of years ago. Did advise she may need an appt and she said that she saw and spoke with Dr Isadora Steele about this last month.   Verified pharmacy and pt # [phone]

## 2019-03-04 ENCOUNTER — OFFICE VISIT (OUTPATIENT)
Dept: CARDIOLOGY CLINIC | Age: 64
End: 2019-03-04

## 2019-03-04 VITALS
HEIGHT: 66 IN | DIASTOLIC BLOOD PRESSURE: 50 MMHG | RESPIRATION RATE: 16 BRPM | WEIGHT: 221 LBS | HEART RATE: 78 BPM | BODY MASS INDEX: 35.52 KG/M2 | SYSTOLIC BLOOD PRESSURE: 116 MMHG

## 2019-03-04 DIAGNOSIS — I10 ESSENTIAL HYPERTENSION: Primary | ICD-10-CM

## 2019-03-04 DIAGNOSIS — I70.90 ATHEROSCLEROSIS: ICD-10-CM

## 2019-03-04 NOTE — PROGRESS NOTES
Chief Complaint Patient presents with  Hypertension  Other [facility] Heart Disease Visit Vitals /50 (BP 1 Location: Right arm, BP Patient Position: Sitting) Pulse 78 Resp 16 Ht 5' 6\" (1.676 m) Wt 221 lb (100.2 kg) LMP 01/01/1995 (Within Months) BMI 35.67 kg/m²

## 2019-03-04 NOTE — PROGRESS NOTES
Denia Lopez MD. [facility] Patient: Yenny Villarreal : 1955 Today's Date: 3/4/2019 HISTORY OF PRESENT ILLNESS:  
 
History of Present Illness: 
Here for follow-up. Biggest issue is back pain. No CP. But has LOMBARDI but activity is limited from back / hip problems. PAST MEDICAL HISTORY:  
 
Past Medical History:  
Diagnosis Date  Diabetes ([address]  FH ischemic heart disease   
 brother with heart disease  Hyperlipidemia  Hypertension Past Surgical History:  
Procedure Laterality Date  ENDOSCOPY, COLON, DIAGNOSTIC    
  normal  
 HX APPENDECTOMY  HX BREAST BIOPSY Left 2017  
 neg; stereotactic bx  HX GYN    
 molar pregnancy  HX GYN    
 CHIQUI part OOphorectomy MEDICATIONS:  
 
Current Outpatient Medications Medication Sig Dispense Refill  metoprolol succinate (TOPROL-XL) 100 mg tablet TAKE 1 TABLET BY MOUTH DAILY. 90 Tab 1  valsartan-hydroCHLOROthiazide (DIOVAN-HCT) 320-25 mg per tablet TAKE 1 TABLET BY MOUTH EVERY DAY 90 Tab 1  
 amLODIPine (NORVASC) 10 mg tablet TAKE 1 TABLET BY MOUTH EVERY DAY 90 Tab 1  
 glyBURIDE (DIABETA) 5 mg tablet TAKE 2 TABLETS BY MOUTH TWICE A  Tab 1  
 atorvastatin (LIPITOR) 20 mg tablet Take 1 Tab by mouth daily. 30 Tab 12  
 aspirin 81 mg chewable tablet Take 1 Tab by mouth daily. (Patient taking differently: Take 162 mg by mouth daily.) 90 Tab 3  
 terazosin (HYTRIN) 2 mg capsule Take 1 Cap by mouth daily. 90 Cap 1  
 metFORMIN (GLUCOPHAGE) 500 mg tablet TAKE 2 TABLETS BY MOUTH TWICE A DAY WITH MEALS 360 Tab 1  
 insulin aspart U-100 (NOVOLOG) 100 unit/mL inpn 20 units at breakfast, 26 units at lunch, 26 units at dinner. ( please give one month) 1 Pen 3  
 ONETOUCH ULTRA BLUE TEST STRIP strip TEST BLOOD SUGAR 3 TIMES A  Strip 3 No Known Allergies SOCIAL HISTORY:  
 
Social History Tobacco Use  Smoking status: Never Smoker  Smokeless tobacco: Never Used Substance Use Topics  Alcohol use: No  
 Drug use: No  
 
 
 
FAMILY HISTORY:  
 
Family History Problem Relation Age of Onset  Cancer Mother   
     breast in her 62s  Breast Cancer Mother 61  
 Heart Failure Father   
     after heart transplant  Hypertension Brother  Heart Attack Brother  Hypertension Brother   
 
 
  
  
REVIEW OF SYMPTOMS:  
  
Review of Symptoms: 
Constitutional: Negative for fever, chills HEENT: Negative for nosebleeds, tinnitus, and vision changes. Respiratory: Negative for cough, wheezing Cardiovascular: Negative for orthopnea, claudication, syncope, and PND. Gastrointestinal: Negative for abdominal pain, diarrhea, melena. Genitourinary: Negative for dysuria Musculoskeletal: Negative for myalgias. Skin: Negative for rash Heme: No problems bleeding. Neurological: Negative for speech change and focal weakness.  
  
  
  
PHYSICAL EXAM:  
  
Physical Exam: 
Visit Vitals /50 (BP 1 Location: Right arm, BP Patient Position: Sitting) Pulse 78 Resp 16 Ht 5' 6\" (1.676 m) Wt 221 lb (100.2 kg) LMP 01/01/1995 (Within Months) BMI 35.67 kg/m² Patient appears generally well, mood and affect are appropriate and pleasant. HEENT:  Hearing intact, non-icteric, normocephalic, atraumatic. Neck Exam: Supple, No JVD. Mild radiating murmur to neck. Lung Exam: Clear to auscultation, even breath sounds. Cardiac Exam: Regular rate and rhythm with brief 9-1/5 systolic murmur Abdomen: Soft, non-tender, obese Extremities: Moves all ext well. No lower extremity edema. Psych: Appropriate affect Neuro - Grossly intact 
  
  
  
LABS / OTHER STUDIES:  
  
  
  
Will get labs from Endocrinologist (Dr. Mal Le)   
  
  
CARDIAC DIAGNOSTICS:  
  
Cardiac Evaluation Includes: 
EKG 7/5/18 - NSR, non-specific T wave abn  
  
CT Heart Scan 8/2/18 - score of 562 is at the 90th percentile rank. Exercise Cardiolite 8/18 - 7 METS, normal MPI, LVEF 73% 
  
  
ASSESSMENT AND PLAN:  
  
Assessment and Plan: 
1) CV risk assessment and management - Her 10 year ASCVD risk is 15% 
- CT Heart Scan 8/2/18 - score of 562 is at the 90th percentile rank. - cont statin 
- recommend ASA 81 mg daily given atherosclerosis - She denies any cardiac complaints   
2) Dyslipidemia 
- recent lipids OK  
- cont statin  
  
3) DM 
- managed by Dr. Hollie Coronel (Endocrine)   
4) HTN 
- BP usually OK  
- goal < 130/80 
- cont meds   
5) Murmur and carotid bruit - check an echo (suspect AV sclerosis) and a carotid doppler 6) See me in 12 months. Patient expressed understanding of the plan - questions were answered. She is from [facility]. In [address] [facility] for 15+ years. Daughter is engaged.   
Rebekah Ryan MD, [address], [address]      [place] [address] [address] Ph: [phone]                               Ph [phone] 
  
  
ADDENDUM   3/27/2019 Carotid Doppler 3/26/19 - 10-49% VERO stenosis; Complete occlusion of LICA Echo 3/26/19 - LVEF 60-65%, mild-mod MR, AV sclerosis Will call pt 
 
ADDENDUM   3/28/2019 I called patient and left a VM to discuss results. Will try later.

## 2019-03-06 DIAGNOSIS — I10 ESSENTIAL HYPERTENSION WITH GOAL BLOOD PRESSURE LESS THAN 140/90: ICD-10-CM

## 2019-03-06 RX ORDER — METFORMIN HYDROCHLORIDE 500 MG/1
TABLET ORAL
Qty: 360 TAB | Refills: 1 | Status: SHIPPED | OUTPATIENT
Start: 2019-03-06 | End: 2019-06-17 | Stop reason: SDUPTHER

## 2019-03-18 LAB — HEMOGLOBIN A1C: 9.4 %

## 2019-03-29 ENCOUNTER — TELEPHONE (OUTPATIENT)
Dept: CARDIOLOGY CLINIC | Age: 64
End: 2019-03-29

## 2019-03-29 NOTE — TELEPHONE ENCOUNTER
Patient calling to speak with someone about the results of her echo on 03/26    She can be reached at [phone]

## 2019-04-01 NOTE — TELEPHONE ENCOUNTER
LVM for pt to call back for results and instructions. RESULTS: Echo is nml study. EF 61-65%. Carotids: Abnormal. Right carotid is occluded, which happened sometime in the past. Treatment is focusing on not occluding left artery. This is done through medication regimen of ASA and statin, which she is currently on. Will continue to observe yearly.

## 2019-04-08 ENCOUNTER — TELEPHONE (OUTPATIENT)
Dept: CARDIOLOGY CLINIC | Age: 64
End: 2019-04-08

## 2019-04-10 ENCOUNTER — TELEPHONE (OUTPATIENT)
Dept: INTERNAL MEDICINE CLINIC | Age: 64
End: 2019-04-10

## 2019-04-10 NOTE — TELEPHONE ENCOUNTER
----- Message from Eugene Cash sent at 4/10/2019  2:17 PM EDT -----  Regarding: Dr. Nichols Adjutant requesting a call in reference to wanting to be referred to a dr that can treat her sciatic nerve pain that is in her back when she walks. Best contact [phone].

## 2019-04-15 ENCOUNTER — TELEPHONE (OUTPATIENT)
Dept: INTERNAL MEDICINE CLINIC | Age: 64
End: 2019-04-15

## 2019-04-15 DIAGNOSIS — E11.9 CONTROLLED TYPE 2 DIABETES MELLITUS WITHOUT COMPLICATION, UNSPECIFIED WHETHER LONG TERM INSULIN USE (HCC): ICD-10-CM

## 2019-04-15 RX ORDER — GLYBURIDE 5 MG/1
TABLET ORAL
Qty: 360 TAB | Refills: 1 | Status: SHIPPED | OUTPATIENT
Start: 2019-04-15 | End: 2019-06-17 | Stop reason: SDUPTHER

## 2019-04-15 NOTE — TELEPHONE ENCOUNTER
Pt is returning Dr. Isai Marr nurse call regarding recommending a \"back doctor\".  Pt's Best Contact Number: [phone]

## 2019-04-16 NOTE — TELEPHONE ENCOUNTER
Contacted pt and provided contact info for Dr. Wesley Hogan. Advised that if they request MRI she will need to schedule appt with Dr. Emeka Clements.  Pt understood

## 2019-04-28 DIAGNOSIS — I10 ESSENTIAL HYPERTENSION WITH GOAL BLOOD PRESSURE LESS THAN 140/90: ICD-10-CM

## 2019-04-28 RX ORDER — AMLODIPINE BESYLATE 10 MG/1
TABLET ORAL
Qty: 90 TAB | Refills: 0 | Status: SHIPPED | OUTPATIENT
Start: 2019-04-28 | End: 2019-06-17 | Stop reason: SDUPTHER

## 2019-05-26 DIAGNOSIS — Z82.49 FH ISCHEMIC HEART DISEASE: ICD-10-CM

## 2019-05-30 RX ORDER — GUAIFENESIN 100 MG/5ML
LIQUID (ML) ORAL
Qty: 90 TAB | Refills: 3 | Status: SHIPPED | OUTPATIENT
Start: 2019-05-30 | End: 2020-05-21

## 2019-06-17 ENCOUNTER — OFFICE VISIT (OUTPATIENT)
Dept: INTERNAL MEDICINE CLINIC | Age: 64
End: 2019-06-17

## 2019-06-17 VITALS
RESPIRATION RATE: 18 BRPM | HEIGHT: 66 IN | WEIGHT: 226 LBS | OXYGEN SATURATION: 94 % | SYSTOLIC BLOOD PRESSURE: 135 MMHG | TEMPERATURE: 97.9 F | BODY MASS INDEX: 36.32 KG/M2 | HEART RATE: 85 BPM | DIASTOLIC BLOOD PRESSURE: 75 MMHG

## 2019-06-17 DIAGNOSIS — I10 ESSENTIAL HYPERTENSION WITH GOAL BLOOD PRESSURE LESS THAN 140/90: ICD-10-CM

## 2019-06-17 DIAGNOSIS — E78.2 MIXED HYPERLIPIDEMIA: ICD-10-CM

## 2019-06-17 DIAGNOSIS — M54.50 CHRONIC BILATERAL LOW BACK PAIN WITHOUT SCIATICA: ICD-10-CM

## 2019-06-17 DIAGNOSIS — G89.29 CHRONIC BILATERAL LOW BACK PAIN WITHOUT SCIATICA: ICD-10-CM

## 2019-06-17 DIAGNOSIS — E11.9 CONTROLLED TYPE 2 DIABETES MELLITUS WITHOUT COMPLICATION, UNSPECIFIED WHETHER LONG TERM INSULIN USE (HCC): Primary | ICD-10-CM

## 2019-06-17 RX ORDER — METFORMIN HYDROCHLORIDE 500 MG/1
1000 TABLET ORAL 2 TIMES DAILY WITH MEALS
Qty: 360 TAB | Refills: 1 | Status: SHIPPED | OUTPATIENT
Start: 2019-06-17 | End: 2021-08-03 | Stop reason: SDUPTHER

## 2019-06-17 RX ORDER — AMLODIPINE BESYLATE 10 MG/1
10 TABLET ORAL DAILY
Qty: 90 TAB | Refills: 0 | Status: SHIPPED | OUTPATIENT
Start: 2019-06-17 | End: 2019-09-30 | Stop reason: SDUPTHER

## 2019-06-17 RX ORDER — TERAZOSIN 2 MG/1
2 CAPSULE ORAL DAILY
Qty: 90 CAP | Refills: 1 | Status: SHIPPED | OUTPATIENT
Start: 2019-06-17 | End: 2019-12-22

## 2019-06-17 RX ORDER — ATORVASTATIN CALCIUM 20 MG/1
20 TABLET, FILM COATED ORAL DAILY
Qty: 30 TAB | Refills: 12 | Status: SHIPPED | OUTPATIENT
Start: 2019-06-17 | End: 2020-01-03 | Stop reason: SDUPTHER

## 2019-06-17 RX ORDER — GLYBURIDE 5 MG/1
10 TABLET ORAL 2 TIMES DAILY WITH MEALS
Qty: 360 TAB | Refills: 1 | Status: SHIPPED | OUTPATIENT
Start: 2019-06-17 | End: 2020-04-17

## 2019-06-17 RX ORDER — VALSARTAN AND HYDROCHLOROTHIAZIDE 320; 25 MG/1; MG/1
1 TABLET, FILM COATED ORAL DAILY
Qty: 90 TAB | Refills: 1 | Status: SHIPPED | OUTPATIENT
Start: 2019-06-17 | End: 2019-12-13

## 2019-06-17 RX ORDER — METOPROLOL SUCCINATE 100 MG/1
100 TABLET, EXTENDED RELEASE ORAL DAILY
Qty: 90 TAB | Refills: 1 | Status: SHIPPED | OUTPATIENT
Start: 2019-06-17 | End: 2020-01-05

## 2019-06-17 NOTE — PROGRESS NOTES
HISTORY OF PRESENT ILLNESS  Jose Luis Costa is a 75 y.o. female. HPI   Back pain: Pt notes that she is having back pain currently. She notes her back aches when she stands. She notes improvement with walking. Denies sciatica. She notes the stretching and exercises during PT are not helping much. Diabetic Review of Systems - further diabetic ROS: no polyuria or polydipsia, no chest pain, dyspnea or TIA's, no numbness, tingling or pain in extremities. Other symptoms and concerns: Pt's last A1C on Friday (06/14/19) was 7.5 per Dr. Kaur Cheng. Hypertension ROS: taking medications as instructed, no medication side effects noted, no TIA's, no chest pain on exertion, no dyspnea on exertion, no swelling of ankles. New concerns: BP in office today is 145/94. Pt notes that last week her BP was 135/75. Hyperlipidemia:  Cardiovascular risk analysis - 75 y.o. female LDL goal is under 130. ROS: taking medications as instructed, no medication side effects noted, no TIA's, no chest pain on exertion, no dyspnea on exertion, no swelling of ankles. Tolerating meds, no myalgias or other side effects noted  New concerns: Pt's last LDL was 112 on 12/16/13. Review of Systems   All other systems reviewed and are negative. Physical Exam   Constitutional: She is oriented to person, place, and time. She appears well-developed and well-nourished. HENT:   Head: Normocephalic and atraumatic. Right Ear: External ear normal.   Left Ear: External ear normal.   Nose: Nose normal.   Mouth/Throat: Oropharynx is clear and moist.   Eyes: Conjunctivae and EOM are normal.   Neck: Normal range of motion. Neck supple. Carotid bruit is not present. No thyroid mass and no thyromegaly present. Cardiovascular: Normal rate, regular rhythm, normal heart sounds and intact distal pulses. Pulmonary/Chest: Effort normal and breath sounds normal.   Abdominal: Soft. Bowel sounds are normal.   Musculoskeletal: Normal range of motion. Neurological: She is alert and oriented to person, place, and time. She has normal strength. No cranial nerve deficit or sensory deficit. Coordination normal.   Skin: Skin is warm and dry. No abrasion and no rash noted. Psychiatric: She has a normal mood and affect. Her behavior is normal. Judgment and thought content normal.   Nursing note and vitals reviewed. ASSESSMENT and PLAN  Diagnoses and all orders for this visit:    1. Controlled type 2 diabetes mellitus without complication, unspecified whether long term insulin use (HCC)  Sugars stable. Continue to follow diabetic diet and monitor sugars. -     glyBURIDE (DIABETA) 5 mg tablet; Take 2 Tabs by mouth two (2) times daily (with meals). 2. Essential hypertension with goal blood pressure less than 140/90  BP is at goal. I do not recommend any change in medications. -     amLODIPine (NORVASC) 10 mg tablet; Take 1 Tab by mouth daily. -     metFORMIN (GLUCOPHAGE) 500 mg tablet; Take 2 Tabs by mouth two (2) times daily (with meals). -     metoprolol succinate (TOPROL-XL) 100 mg tablet; Take 1 Tab by mouth daily. -     valsartan-hydroCHLOROthiazide (DIOVAN-HCT) 320-25 mg per tablet; Take 1 Tab by mouth daily. -     terazosin (HYTRIN) 2 mg capsule; Take 1 Cap by mouth daily. 3. Mixed hyperlipidemia  Stable, patient is tolerating pain medications, no myalgias. I do not recommend any change in medications. -     atorvastatin (LIPITOR) 20 mg tablet; Take 1 Tab by mouth daily. 4. Chronic bilateral low back pain without sciatica  Advised pt to try and do pool therapy (walking in the pool) to help strengthen her back. Lab results and schedule of future lab studies reviewed with patient. Reviewed diet, exercise and weight control. Written by Denisha Sanchez, as dictated by Joceline Kay MD.     Current diagnosis and concerns discussed with pt at length.  Understands risks and benefits or current treatment plan and medications and accepts the treatment and medication with any possible risks. Pt asks appropriate questions which were answered. Pt instructed to call with any concerns or problems. This note will not be viewable in [address]

## 2019-06-19 LAB — HEMOGLOBIN A1C: 7.5 %

## 2019-09-30 DIAGNOSIS — I10 ESSENTIAL HYPERTENSION WITH GOAL BLOOD PRESSURE LESS THAN 140/90: ICD-10-CM

## 2019-09-30 RX ORDER — AMLODIPINE BESYLATE 10 MG/1
TABLET ORAL
Qty: 90 TAB | Refills: 0 | Status: SHIPPED | OUTPATIENT
Start: 2019-09-30 | End: 2020-02-28

## 2019-11-05 ENCOUNTER — HOSPITAL ENCOUNTER (OUTPATIENT)
Dept: MAMMOGRAPHY | Age: 64
Discharge: HOME | End: 2019-11-05
Attending: INTERNAL MEDICINE
Payer: MEDICARE

## 2019-11-05 DIAGNOSIS — Z12.31 VISIT FOR SCREENING MAMMOGRAM: ICD-10-CM

## 2019-11-05 PROCEDURE — 77067 SCR MAMMO BI INCL CAD: CPT

## 2019-11-19 ENCOUNTER — HOSPITAL ENCOUNTER (OUTPATIENT)
Dept: MRI IMAGING | Age: 64
Discharge: HOME | End: 2019-11-19
Attending: ORTHOPAEDIC SURGERY
Payer: MEDICARE

## 2019-11-19 DIAGNOSIS — M43.10 SPONDYLISTHESIS: ICD-10-CM

## 2019-11-19 DIAGNOSIS — M54.50 LOW BACK PAIN: ICD-10-CM

## 2019-11-19 PROCEDURE — 72148 MRI LUMBAR SPINE W/O DYE: CPT

## 2019-12-13 ENCOUNTER — TELEPHONE (OUTPATIENT)
Dept: INTERNAL MEDICINE CLINIC | Age: 64
End: 2019-12-13

## 2019-12-13 ENCOUNTER — OFFICE VISIT (OUTPATIENT)
Dept: INTERNAL MEDICINE CLINIC | Age: 64
End: 2019-12-13

## 2019-12-13 ENCOUNTER — OFFICE VISIT (OUTPATIENT)
Dept: CARDIOLOGY CLINIC | Age: 64
End: 2019-12-13

## 2019-12-13 VITALS
SYSTOLIC BLOOD PRESSURE: 138 MMHG | DIASTOLIC BLOOD PRESSURE: 70 MMHG | OXYGEN SATURATION: 97 % | WEIGHT: 230 LBS | HEIGHT: 66 IN | BODY MASS INDEX: 36.96 KG/M2 | HEART RATE: 80 BPM | RESPIRATION RATE: 20 BRPM

## 2019-12-13 VITALS
HEART RATE: 65 BPM | SYSTOLIC BLOOD PRESSURE: 136 MMHG | BODY MASS INDEX: 37.19 KG/M2 | OXYGEN SATURATION: 98 % | WEIGHT: 231.4 LBS | TEMPERATURE: 97.5 F | HEIGHT: 66 IN | RESPIRATION RATE: 18 BRPM | DIASTOLIC BLOOD PRESSURE: 73 MMHG

## 2019-12-13 DIAGNOSIS — Z82.49 FH ISCHEMIC HEART DISEASE: ICD-10-CM

## 2019-12-13 DIAGNOSIS — R01.1 MURMUR: ICD-10-CM

## 2019-12-13 DIAGNOSIS — I70.90 ATHEROSCLEROSIS: ICD-10-CM

## 2019-12-13 DIAGNOSIS — E78.2 MIXED HYPERLIPIDEMIA: ICD-10-CM

## 2019-12-13 DIAGNOSIS — R06.02 SOB (SHORTNESS OF BREATH): Primary | ICD-10-CM

## 2019-12-13 DIAGNOSIS — R09.89 CAROTID BRUIT, UNSPECIFIED LATERALITY: ICD-10-CM

## 2019-12-13 DIAGNOSIS — I10 ESSENTIAL HYPERTENSION WITH GOAL BLOOD PRESSURE LESS THAN 140/90: ICD-10-CM

## 2019-12-13 DIAGNOSIS — E11.9 CONTROLLED TYPE 2 DIABETES MELLITUS WITHOUT COMPLICATION, UNSPECIFIED WHETHER LONG TERM INSULIN USE (HCC): ICD-10-CM

## 2019-12-13 DIAGNOSIS — R06.83 SNORING: ICD-10-CM

## 2019-12-13 DIAGNOSIS — I10 ESSENTIAL HYPERTENSION: ICD-10-CM

## 2019-12-13 RX ORDER — CHLORTHALIDONE 25 MG/1
25 TABLET ORAL DAILY
Qty: 30 TAB | Refills: 12 | Status: SHIPPED | OUTPATIENT
Start: 2019-12-13 | End: 2019-12-13

## 2019-12-13 RX ORDER — SPIRONOLACTONE 25 MG/1
25 TABLET ORAL DAILY
Qty: 30 TAB | Refills: 12 | Status: SHIPPED | OUTPATIENT
Start: 2019-12-13 | End: 2020-11-18

## 2019-12-13 RX ORDER — CHLORTHALIDONE 25 MG/1
25 TABLET ORAL DAILY
Qty: 30 TAB | Refills: 12 | Status: SHIPPED | OUTPATIENT
Start: 2019-12-13 | End: 2020-12-18 | Stop reason: SDUPTHER

## 2019-12-13 NOTE — PROGRESS NOTES
All health maintenance and other pertinent information has been reviewed in preparation for today's office visit. Patient presents in the office today for:    Chief Complaint   Patient presents with    ED Follow-up     DMII, Heart Disease, LOMBARDI, Essential HTN       1. Have you been to the ER, urgent care clinic since your last visit? Hospitalized since your last visit? Yes. Alexx Dominguez today, See notes. Pt also saw cardiology today. 2. Have you seen or consulted any other health care providers outside of the [address] [place] [address] since your last visit? Include any pap smears or colon screening.  No

## 2019-12-13 NOTE — PROGRESS NOTES
HISTORY OF PRESENT ILLNESS  John Al is a 75 y.o. female. HPI Presents with  Song Gillis  SOB: Pt reports that she presented at [facility] ED due to SOB- thought to be due to fluid around lungs. VMP was elevated. She was advised to f/u with cardiologist (Dr. Ruby Reid). She was found to be slightly anemic with elevated fluid status. Dr. Ruby Reid wants pt to start Chlorthalidone and Spironolactone. And f/u to recheck labs in a 3 weeks. He referred her to pulmonology for a sleep study due to snoring. He would like to do an ECHO in the future- he is concerned about possible CHF. Hypertension ROS: taking medications as instructed, no medication side effects noted, no TIA's, no chest pain on exertion, no dyspnea on exertion, no swelling of ankles. New concerns: BP in office today is 138/70. Hyperlipidemia:  Cardiovascular risk analysis - 75 y.o. female LDL goal is under 130. ROS: taking medications as instructed, no medication side effects noted, no TIA's, no chest pain on exertion, no dyspnea on exertion, no swelling of ankles. Tolerating meds, no myalgias or other side effects noted  New concerns: Pt's last LDL was 49 on 11/16/17. Diabetic Review of Systems - diabetic diet compliance: compliant most of the time, further diabetic ROS: no polyuria or polydipsia, no chest pain, dyspnea or TIA's, no numbness, tingling or pain in extremities. Other symptoms and concerns: She notes relatively stable BG. Her a1c was high last time. She thinks she is eating healthily. Pt received both pneumonia and Shingrex vaccine. Review of Systems   All other systems reviewed and are negative. Physical Exam  Vitals signs and nursing note reviewed. Constitutional:       Appearance: She is well-developed. HENT:      Head: Normocephalic and atraumatic.       Right Ear: External ear normal.      Left Ear: External ear normal.      Nose: Nose normal.   Eyes:      Conjunctiva/sclera: Conjunctivae normal. Pupils: Pupils are equal, round, and reactive to light. Neck:      Musculoskeletal: Normal range of motion and neck supple. Thyroid: No thyroid mass or thyromegaly. Vascular: No carotid bruit. Cardiovascular:      Rate and Rhythm: Normal rate and regular rhythm. Heart sounds: Normal heart sounds. Pulmonary:      Effort: Pulmonary effort is normal.      Breath sounds: Normal breath sounds. Chest:      Breasts: Breasts are symmetrical.         Right: No inverted nipple, mass, nipple discharge, skin change or tenderness. Left: No inverted nipple, mass, nipple discharge, skin change or tenderness. Abdominal:      General: Bowel sounds are normal.      Palpations: Abdomen is soft. Genitourinary:     Vagina: Normal.      Rectum: Normal. Guaiac result negative. Normal anal tone. Musculoskeletal: Normal range of motion. Skin:     General: Skin is warm and dry. Findings: No abrasion or rash. Neurological:      Mental Status: She is alert and oriented to person, place, and time. Cranial Nerves: No cranial nerve deficit. Sensory: No sensory deficit. Coordination: Coordination normal.   Psychiatric:         Behavior: Behavior normal.         Thought Content: Thought content normal.         Judgment: Judgment normal.         ASSESSMENT and PLAN  Diagnoses and all orders for this visit:    1. SOB (shortness of breath)  Informed pt that according to her labs- the likely and assumed etiology of her SOB is CHF. Explained to pt that the fluid in her lungs is similar to a traffic jam, because the heart was struggling to pump the excess fluid. Described the excess fluid causing pressure on her lungs- made comparisons for pt and  to understand. Discussed with pt that she will have to stay on top of potassium and sodium due to being put on a fluid pill. Explained that is why she is going to do labs in 5 days and again in 3 weeks.  Discussed with pt that sleep apnea can cause fluid build up. She will f/u with Dr. Michelle Monahan on 01/03/20. Advised pt to f/u in Feb after she meets with her cardiologist.     2. Controlled type 2 diabetes mellitus without complication, unspecified whether long term insulin use (HCC)  Sugars stable. Continue to follow diabetic diet and monitor sugars. 3. Essential hypertension with goal blood pressure less than 140/90  BP is at goal. I do not recommend any change in medications. 4. Mixed hyperlipidemia  Stable, patient is tolerating pain medications, no myalgias. I do not recommend any change in medications. Additional comments: Informs pt that if she should seriously consider getting the flu vaccine. Defers flu vaccine. Lab results and schedule of future lab studies reviewed with patient. Reviewed diet, exercise and weight control. Written by Carlos Peterson, as dictated by Xu Morales MD.     Current diagnosis and concerns discussed with pt at length. Understands risks and benefits or current treatment plan and medications and accepts the treatment and medication with any possible risks. Pt asks appropriate questions which were answered. Pt instructed to call with any concerns or problems. This note will not be viewable in [address]

## 2019-12-13 NOTE — TELEPHONE ENCOUNTER
In your note you said you were putting her on chlorothalidone and aldactone but it looks like chlorothalidone was D/C?

## 2019-12-13 NOTE — PROGRESS NOTES
Meri Fraga is a 75 y.o. female    Chief Complaint   Patient presents with   [facility] Follow Up     Yazmin    Shortness of Breath    Hypertension    Other     Atherosclerosis       Chest pain : NO  SOB : Patient states she has SOB with exertion. Dizziness : NO  Edema : NO  Refills : NO    Visit Vitals  /70 (BP 1 Location: Left arm, BP Patient Position: Sitting)   Pulse 80   Resp 20   Ht 5' 6\" (1.676 m)   Wt 230 lb (104.3 kg)   LMP 01/01/1995 (Within Months)   SpO2 97%   BMI 37.12 kg/m²       1. Have you been to the ER, urgent care clinic since your last visit? Hospitalized since your last visit? [facility].    2. Have you seen or consulted any other health care providers outside of the [address] [place] [address] since your last visit? Include any pap smears or colon screening.  No

## 2019-12-13 NOTE — PROGRESS NOTES
Juan Edwards MD. [facility]              Patient: Chacho Samaniego  : 1955      Today's Date: 2019          HISTORY OF PRESENT ILLNESS:     History of Present Illness:  SOB has been getting worse (going on for one year but getting worse). Last Saturday tried walking but was panting. She went to ER at [address] - had labs and CXR/EKG and sent home. Still has LOMBARDI. Denies orthopnea. No CP. Off of valsartan-HCT for a week. PAST MEDICAL HISTORY:     Past Medical History:   Diagnosis Date    Atherosclerosis     CT Heart Scan 18 - score of 562 is at the 90th percentile rank.  Back pain     Carotid artery disease (HCC)     Diabetes ([address]     Diastolic heart failure ([address]           ischemic heart disease     brother with heart disease     Hyperlipidemia     Hypertension        Past Surgical History:   Procedure Laterality Date    ENDOSCOPY, COLON, DIAGNOSTIC       normal    HX APPENDECTOMY      HX BREAST BIOPSY Left 2017    neg; stereotactic bx    HX GYN      molar pregnancy    HX GYN      CHIQUI part OOphorectomy         MEDICATIONS:     Current Outpatient Medications   Medication Sig Dispense Refill    amLODIPine (NORVASC) 10 mg tablet TAKE 1 TABLET BY MOUTH EVERY DAY 90 Tab 0    glyBURIDE (DIABETA) 5 mg tablet Take 2 Tabs by mouth two (2) times daily (with meals). 360 Tab 1    metFORMIN (GLUCOPHAGE) 500 mg tablet Take 2 Tabs by mouth two (2) times daily (with meals). 360 Tab 1    metoprolol succinate (TOPROL-XL) 100 mg tablet Take 1 Tab by mouth daily. 90 Tab 1    valsartan-hydroCHLOROthiazide (DIOVAN-HCT) 320-25 mg per tablet Take 1 Tab by mouth daily. 90 Tab 1    atorvastatin (LIPITOR) 20 mg tablet Take 1 Tab by mouth daily. 30 Tab 12    terazosin (HYTRIN) 2 mg capsule Take 1 Cap by mouth daily.  90 Cap 1    aspirin 81 mg chewable tablet TAKE 1 TABLET BY MOUTH EVERY DAY 90 Tab 3    ONETOUCH ULTRA BLUE TEST STRIP strip TEST BLOOD SUGAR 3 TIMES A DAY 100 Strip 3    insulin aspart U-100 (NOVOLOG) 100 unit/mL inpn 20 units at breakfast, 26 units at lunch, 26 units at dinner. ( please give one month) 1 Pen 3       No Known Allergies        SOCIAL HISTORY:     Social History     Tobacco Use    Smoking status: Never Smoker    Smokeless tobacco: Never Used   Substance Use Topics    Alcohol use: No    Drug use: No         FAMILY HISTORY:     Family History   Problem Relation Age of Onset    Cancer Mother         breast in her 62s    Breast Cancer Mother 61    Heart Failure Father         after heart transplant    Hypertension Brother     Heart Attack Brother     Hypertension Brother           REVIEW OF SYMPTOMS:      Review of Symptoms:  Constitutional: Negative for fever, chills  HEENT: Negative for nosebleeds, tinnitus, and vision changes. Respiratory:  + LOMBARDI./cough  Cardiovascular: Negative for orthopnea, claudication, syncope, and PND. Gastrointestinal: Negative for abdominal pain, diarrhea, melena. Genitourinary: Negative for dysuria  Musculoskeletal: Negative for myalgias. Skin: Negative for rash  Heme: No problems bleeding. Neurological: Negative for speech change and focal weakness.            PHYSICAL EXAM:      Physical Exam:  Visit Vitals  /70 (BP 1 Location: Left arm, BP Patient Position: Sitting)   Pulse 80   Resp 20   Ht 5' 6\" (1.676 m)   Wt 230 lb (104.3 kg)   LMP 01/01/1995 (Within Months)   SpO2 97%   BMI 37.12 kg/m²       Patient appears generally well, mood and affect are appropriate and pleasant. HEENT:  Hearing intact, non-icteric, normocephalic, atraumatic. Neck Exam: Supple,   Lung Exam: Clear to auscultation, even breath sounds. Cardiac Exam: Regular rate and rhythm with brief 0-0/1 systolic murmur  Abdomen: Soft, non-tender, obese   Extremities: Moves all ext well. No lower extremity edema.   Psych: Appropriate affect  Neuro - Grossly intact           LABS / OTHER STUDIES:        Lab Results   Component Value Date/Time    Cholesterol, total 121 11/16/2017    HDL Cholesterol 35 11/16/2017    HDL Cholesterol 33 (L) 12/16/2013 09:39 AM    LDL-CHOLESTEROL 49 11/16/2017    LDL, calculated 112 (H) 12/16/2013 09:39 AM    VLDL, calculated 40 12/16/2013 09:39 AM    Triglyceride 198 (H) 12/16/2013 09:39 AM    Triglycerides 183 11/16/2017     Lab Results   Component Value Date/Time    Sodium 136 12/16/2013 09:39 AM    Potassium 4.0 12/16/2013 09:39 AM    Chloride 97 12/16/2013 09:39 AM    CO2 26 12/16/2013 09:39 AM    Glucose 153 (H) 12/16/2013 09:39 AM    BUN 12 12/16/2013 09:39 AM    Creatinine 0.61 12/16/2013 09:39 AM    BUN/Creatinine ratio 20 12/16/2013 09:39 AM    GFR est  12/16/2013 09:39 AM    GFR est non-AA 99 12/16/2013 09:39 AM    Calcium 9.7 12/16/2013 09:39 AM    Bilirubin, total 0.5 12/16/2013 09:39 AM    AST (SGOT) 24 12/16/2013 09:39 AM    Alk. phosphatase 83 12/16/2013 09:39 AM    Protein, total 7.1 12/16/2013 09:39 AM    Albumin 4.2 12/16/2013 09:39 AM    A-G Ratio 1.4 12/16/2013 09:39 AM    ALT (SGPT) 35 (H) 12/16/2013 09:39 AM           Labs 12/8/19 - Hgb 10.6, D-Dimer nml, proBNP 695, Cr 0.6, K 3.5, trop nml             CARDIAC DIAGNOSTICS:      Cardiac Evaluation Includes:  EKG 7/5/18 - NSR, non-specific T wave abn   EKG 12/13/19 - NSR, non-specific T wave abn        CT Heart Scan 8/2/18 - score of 562 is at the 90th percentile rank. Exercise Cardiolite 8/18 - 7 METS, normal MPI, LVEF 73%    Carotid Doppler 3/26/19 - 10-49% VERO stenosis;  Complete occlusion of LICA     Echo 1/25/02 - LVEF 60-65%, mild-mod MR, AV sclerosis     CXR 12/8/19 ([facility]) - small bilat pleural effusions         ASSESSMENT AND PLAN:      Assessment and Plan:  1) Acute diastolic HF  - SOB, pleural effusions and elevated proBNP c/w CHF   - Will increase her diuretics ---> stop valsartan-HCTZ (she has not had this in a week) and instead start her on chlorthalidone 25 mg daily (half dose initially) and aldactone 25 mg daily - recheck labs in a week. - recheck an echo     2) Likely sleep apnea - she snores  - check a sleep study     3) Carotid Artery disease  - Carotid Doppler 3/26/19 - 10-49% VERO stenosis; Complete occlusion of LICA  - cont statin and ASA  - check yearly dopplers    4) Dyslipidemia  - she should be on a more potent statin given vascular disease --> plan to intensify statin after addressing CHF above     5) DM  - managed by Dr. Rafi Eisenberg (Endocrine)      6) HTN  - BP usually OK   - goal < 130/80  - cont meds    7) See me in 3 weeks. Patient expressed understanding of the plan - questions were answered.      She is from [facility]. [address] [facility] [place] [employer] [facility]. Daughter is engaged. 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  [address]  [address]  Ph: [phone]                                [phone]

## 2019-12-22 DIAGNOSIS — I10 ESSENTIAL HYPERTENSION WITH GOAL BLOOD PRESSURE LESS THAN 140/90: ICD-10-CM

## 2019-12-22 RX ORDER — TERAZOSIN 2 MG/1
CAPSULE ORAL
Qty: 90 CAP | Refills: 1 | Status: SHIPPED | OUTPATIENT
Start: 2019-12-22 | End: 2020-06-22

## 2019-12-23 LAB
BUN SERPL-MCNC: 14 MG/DL (ref 8–27)
BUN/CREAT SERPL: 17 (ref 12–28)
CALCIUM SERPL-MCNC: 10 MG/DL (ref 8.7–10.3)
CHLORIDE SERPL-SCNC: 101 MMOL/L (ref 96–106)
CHOLEST SERPL-MCNC: 109 MG/DL (ref 100–199)
CO2 SERPL-SCNC: 25 MMOL/L (ref 20–29)
CREAT SERPL-MCNC: 0.81 MG/DL (ref 0.57–1)
GLUCOSE SERPL-MCNC: 129 MG/DL (ref 65–99)
HDL SERPL-SCNC: 26.7 UMOL/L
HDLC SERPL-MCNC: 37 MG/DL
INTERPRETATION, 910389: NORMAL
LDL SERPL QN: 20.5 NM
LDL SERPL-SCNC: 735 NMOL/L
LDL SMALL SERPL-SCNC: 419 NMOL/L
LDLC SERPL CALC-MCNC: 49 MG/DL (ref 0–99)
LP-IR SCORE SERPL: 56
NT-PROBNP SERPL-MCNC: 231 PG/ML (ref 0–301)
POTASSIUM SERPL-SCNC: 4.8 MMOL/L (ref 3.5–5.2)
SODIUM SERPL-SCNC: 143 MMOL/L (ref 134–144)
SPECIMEN STATUS REPORT, ROLRST: NORMAL
TRIGL SERPL-MCNC: 115 MG/DL (ref 0–149)
TSH SERPL DL<=0.005 MIU/L-ACNC: 4.28 UIU/ML (ref 0.45–4.5)

## 2020-01-01 LAB
BUN SERPL-MCNC: 22 MG/DL (ref 8–27)
BUN/CREAT SERPL: 20 (ref 12–28)
CALCIUM SERPL-MCNC: 10.3 MG/DL (ref 8.7–10.3)
CHLORIDE SERPL-SCNC: 102 MMOL/L (ref 96–106)
CO2 SERPL-SCNC: 25 MMOL/L (ref 20–29)
CREAT SERPL-MCNC: 1.12 MG/DL (ref 0.57–1)
GLUCOSE SERPL-MCNC: 146 MG/DL (ref 65–99)
NT-PROBNP SERPL-MCNC: 204 PG/ML (ref 0–301)
POTASSIUM SERPL-SCNC: 4.7 MMOL/L (ref 3.5–5.2)
SODIUM SERPL-SCNC: 142 MMOL/L (ref 134–144)

## 2020-01-03 ENCOUNTER — OFFICE VISIT (OUTPATIENT)
Dept: CARDIOLOGY CLINIC | Age: 65
End: 2020-01-03

## 2020-01-03 VITALS
WEIGHT: 224 LBS | SYSTOLIC BLOOD PRESSURE: 142 MMHG | DIASTOLIC BLOOD PRESSURE: 80 MMHG | HEIGHT: 66 IN | BODY MASS INDEX: 36 KG/M2 | HEART RATE: 64 BPM | OXYGEN SATURATION: 97 %

## 2020-01-03 DIAGNOSIS — I10 ESSENTIAL HYPERTENSION: ICD-10-CM

## 2020-01-03 DIAGNOSIS — I70.90 ATHEROSCLEROSIS: Primary | ICD-10-CM

## 2020-01-03 DIAGNOSIS — E78.2 MIXED HYPERLIPIDEMIA: ICD-10-CM

## 2020-01-03 RX ORDER — ATORVASTATIN CALCIUM 40 MG/1
40 TABLET, FILM COATED ORAL DAILY
Qty: 90 TAB | Refills: 3 | Status: SHIPPED | OUTPATIENT
Start: 2020-01-03 | End: 2020-11-18

## 2020-01-03 NOTE — PROGRESS NOTES
Lc Solomon MD. [facility]              Patient: Allen Cano  : 1955      Today's Date: 1/3/2020          HISTORY OF PRESENT ILLNESS:     History of Present Illness:  She is here for follow-up. Breathing is better on diuretics. No leg swelling. She is feeling better. No swelling. PAST MEDICAL HISTORY:     Past Medical History:   Diagnosis Date    Atherosclerosis     CT Heart Scan 18 - score of 562 is at the 90th percentile rank.  Back pain     Carotid artery disease (HCC)     Diabetes ([address]     Diastolic heart failure ([address]           ischemic heart disease     brother with heart disease     Hyperlipidemia     Hypertension        Past Surgical History:   Procedure Laterality Date    ENDOSCOPY, COLON, DIAGNOSTIC       normal    HX APPENDECTOMY      HX BREAST BIOPSY Left 2017    neg; stereotactic bx    HX GYN      molar pregnancy    HX GYN      CHIQUI part OOphorectomy           MEDICATIONS:     Current Outpatient Medications   Medication Sig Dispense Refill    terazosin (HYTRIN) 2 mg capsule TAKE 1 CAPSULE BY MOUTH EVERY DAY 90 Cap 1    spironolactone (ALDACTONE) 25 mg tablet Take 1 Tab by mouth daily. 30 Tab 12    chlorthalidone (HYGROTEN) 25 mg tablet Take 1 Tab by mouth daily. 30 Tab 12    amLODIPine (NORVASC) 10 mg tablet TAKE 1 TABLET BY MOUTH EVERY DAY 90 Tab 0    glyBURIDE (DIABETA) 5 mg tablet Take 2 Tabs by mouth two (2) times daily (with meals). 360 Tab 1    metFORMIN (GLUCOPHAGE) 500 mg tablet Take 2 Tabs by mouth two (2) times daily (with meals). (Patient taking differently: Take 1,000 mg by mouth two (2) times daily (with meals). Indications: 2 in the morning and 1 at night) 360 Tab 1    metoprolol succinate (TOPROL-XL) 100 mg tablet Take 1 Tab by mouth daily. 90 Tab 1    atorvastatin (LIPITOR) 20 mg tablet Take 1 Tab by mouth daily.  30 Tab 12    aspirin 81 mg chewable tablet TAKE 1 TABLET BY MOUTH EVERY DAY 90 Tab 3    ONETOUCH ULTRA BLUE TEST STRIP strip TEST BLOOD SUGAR 3 TIMES A  Strip 3    insulin aspart U-100 (NOVOLOG) 100 unit/mL inpn 20 units at breakfast, 26 units at lunch, 26 units at dinner. ( please give one month) (Patient taking differently: 75-50 units at breakfast,     25 each units at night  ( please give one month)) 1 Pen 3       No Known Allergies          SOCIAL HISTORY:     Social History     Tobacco Use    Smoking status: Never Smoker    Smokeless tobacco: Never Used   Substance Use Topics    Alcohol use: No    Drug use: No         FAMILY HISTORY:     Family History   Problem Relation Age of Onset    Cancer Mother         breast in her 62s    Breast Cancer Mother 61    Heart Failure Father         after heart transplant    Hypertension Brother     Heart Attack Brother     Hypertension Brother             REVIEW OF SYMPTOMS:      Review of Symptoms:  Constitutional: Negative for fever, chills  HEENT: Negative for nosebleeds, tinnitus, and vision changes. Respiratory:  + LOMBARDI./cough  Cardiovascular: Negative for orthopnea, claudication, syncope, and PND. Gastrointestinal: Negative for abdominal pain, diarrhea, melena. Genitourinary: Negative for dysuria  Musculoskeletal: Negative for myalgias. Skin: Negative for rash  Heme: No problems bleeding. Neurological: Negative for speech change and focal weakness.            PHYSICAL EXAM:      Physical Exam:  Visit Vitals  /80 (BP 1 Location: Left arm, BP Patient Position: Sitting)   Pulse 64   Ht 5' 6\" (1.676 m)   Wt 224 lb (101.6 kg)   LMP 01/01/1995 (Within Months)   SpO2 97%   BMI 36.15 kg/m²          Patient appears generally well, mood and affect are appropriate and pleasant. HEENT:  Hearing intact, non-icteric, normocephalic, atraumatic. Neck Exam: Supple, No JVD  Lung Exam: Clear to auscultation, even breath sounds.    Cardiac Exam: Regular rate and rhythm with brief 2/6 systolic murmur  Abdomen: Soft, non-tender, obese   Extremities: Moves all ext well. No lower extremity edema. Psych: Appropriate affect  Neuro - Grossly intact           LABS / OTHER STUDIES:              Labs 12/8/19 - Hgb 10.6, D-Dimer nml, proBNP 695, Cr 0.6, K 3.5, trop nml       Component      Latest Ref Rng & Units 12/31/2019 12/18/2019           9:43 AM  8:58 AM   NT pro-BNP      0 - 301 pg/mL 204 231     Component      Latest Ref Rng & Units 12/31/2019 12/18/2019           9:43 AM  8:58 AM   Glucose      65 - 99 mg/dL 146 (H) 129 (H)   BUN      8 - 27 mg/dL 22 14   Creatinine      0.57 - 1.00 mg/dL 1.12 (H) 0.81   GFR est non-AA      >59 mL/min/1.73 51 (L) 76   GFR est AA      >59 mL/min/1.73 59 (L) 88   BUN/Creatinine ratio      12 - 28 20 17   Sodium      134 - 144 mmol/L 142 143   Potassium      3.5 - 5.2 mmol/L 4.7 4.8   Chloride      96 - 106 mmol/L 102 101   CO2      20 - 29 mmol/L 25 25   Calcium      8.7 - 10.3 mg/dL 10.3 10.0     Component      Latest Ref Rng & Units 12/18/2019           8:58 AM   LDL-P      <1,000 nmol/L 735   LDL-C      0 - 99 mg/dL 49   HDL-C      >39 mg/dL 37 (L)   Triglycerides      0 - 149 mg/dL 115   Cholesterol, total      100 - 199 mg/dL 109   HDL-P (Total)      >=30.5 umol/L 26.7 (L)   Small LDL-P      <=527 nmol/L 419   LDL size      >20.5 nm 20.5 (L)   LP-IR SCORE      <=45 56 (H)              CARDIAC DIAGNOSTICS:      Cardiac Evaluation Includes:  EKG 7/5/18 - NSR, non-specific T wave abn   EKG 12/13/19 - NSR, non-specific T wave abn         CT Heart Scan 8/2/18 - score of 562 is at the 90th percentile rank. Exercise Cardiolite 8/18 - 7 METS, normal MPI, LVEF 73%     Carotid Doppler 3/26/19 - 10-49% VERO stenosis;  Complete occlusion of LICA     Echo 5/91/73 - LVEF 60-65%, mild-mod MR, AV sclerosis      CXR 12/8/19 ([facility]) - small bilat pleural effusions     Echo 1/3/20 - LVEF 60-65%, mild LAE, mild-mod MR         ASSESSMENT AND PLAN:      Assessment and Plan:  1) SOB  - On 12/8/19 -  SOB, pleural effusions and elevated proBNP suggestive of CHF - At that time increased her diuretics ---> stop valsartan-HCTZ (she has not had this in a week) and instead start her on chlorthalidone 25 mg daily (half dose initially) and aldactone 25 mg daily   - On FU 1/3/20 -- proBNP has been normal and now she looks dry according to labs (Cr elevated). She is feeling better on additional diuretics and breathing is much better. Will continue meds and asked her to stay hydrated with water. Encourage exercise. 2) Likely sleep apnea - she snores  - consider a sleep study - she prefers to hold off for now      3) Carotid Artery disease  - Carotid Doppler 3/26/19 - 10-49% VERO stenosis; Complete occlusion of LICA  - cont statin and ASA  - check yearly dopplers     4) Dyslipidemia  - she should be on a more potent statin given vascular disease   ---> increase atorvastatin to 40 mg daily      5) DM  - managed by Dr. Oscar Chapin (Endocrine)      6) HTN  - BP a little high --- cont meds - cut back salt - follow BP at home      7) See me in 8 weeks.   Patient expressed understanding of the plan - questions were answered.      She is from [address] for 15+ years.  Daughter is engaged.  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  [address]  [facility], [address] [place] [address]  Ph: [phone]                                [phone]

## 2020-01-05 DIAGNOSIS — I10 ESSENTIAL HYPERTENSION WITH GOAL BLOOD PRESSURE LESS THAN 140/90: ICD-10-CM

## 2020-01-05 RX ORDER — METOPROLOL SUCCINATE 100 MG/1
TABLET, EXTENDED RELEASE ORAL
Qty: 90 TAB | Refills: 1 | Status: SHIPPED | OUTPATIENT
Start: 2020-01-05 | End: 2020-08-18

## 2020-01-13 ENCOUNTER — OFFICE VISIT (OUTPATIENT)
Dept: INTERNAL MEDICINE CLINIC | Age: 65
End: 2020-01-13

## 2020-01-13 VITALS
DIASTOLIC BLOOD PRESSURE: 63 MMHG | BODY MASS INDEX: 35.9 KG/M2 | RESPIRATION RATE: 18 BRPM | HEIGHT: 66 IN | HEART RATE: 87 BPM | WEIGHT: 223.4 LBS | TEMPERATURE: 98.2 F | OXYGEN SATURATION: 98 % | SYSTOLIC BLOOD PRESSURE: 131 MMHG

## 2020-01-13 DIAGNOSIS — Z00.00 MEDICARE ANNUAL WELLNESS VISIT, SUBSEQUENT: ICD-10-CM

## 2020-01-13 DIAGNOSIS — E11.9 CONTROLLED TYPE 2 DIABETES MELLITUS WITHOUT COMPLICATION, UNSPECIFIED WHETHER LONG TERM INSULIN USE (HCC): Primary | ICD-10-CM

## 2020-01-13 DIAGNOSIS — I10 ESSENTIAL HYPERTENSION WITH GOAL BLOOD PRESSURE LESS THAN 140/90: ICD-10-CM

## 2020-01-13 DIAGNOSIS — E78.2 MIXED HYPERLIPIDEMIA: ICD-10-CM

## 2020-01-13 DIAGNOSIS — R06.02 SOB (SHORTNESS OF BREATH): ICD-10-CM

## 2020-01-13 PROBLEM — E11.3293 MILD NONPROLIFERATIVE DIABETIC RETINOPATHY OF BOTH EYES WITHOUT MACULAR EDEMA (HCC): Status: ACTIVE | Noted: 2017-07-10

## 2020-01-13 RX ORDER — VALSARTAN AND HYDROCHLOROTHIAZIDE 320; 25 MG/1; MG/1
1 TABLET, FILM COATED ORAL DAILY
COMMUNITY
End: 2020-01-13

## 2020-01-13 NOTE — PROGRESS NOTES
HISTORY OF PRESENT ILLNESS  Agustin Godwin is a 75 y.o. female. HPI  SOB: Stable with drastic improvement in her breathing since starting her new meds. She went to a cardiologist and was put on Chlorthalidone 25 mg and Aldactone 25 mg daily. She had an ECHO cardiogram on 1/03/20 which showed mild thickness of her ventricle. She is able to walk a few blocks with no SOB. Hypertension ROS: taking medications as instructed, no medication side effects noted, no TIA's, no chest pain on exertion, no dyspnea on exertion, no swelling of ankles. New concerns: BP in office today is 131/63. Pt is not taking Valsartan-HCTZ. Diabetic Review of Systems - further diabetic ROS: no polyuria or polydipsia, no chest pain, dyspnea or TIA's, no numbness, tingling or pain in extremities. Other symptoms and concerns: She notes her BG have been too stable- and she thinks her a1c will be slightly elevated. She has a f/u appt with her DM specialist in Feb. She thinks her a1c was approx 8% in the fall. Hyperlipidemia:  Cardiovascular risk analysis - 75 y.o. female LDL goal is under 100. ROS: taking medications as instructed, no medication side effects noted, no TIA's, no chest pain on exertion, no dyspnea on exertion, no swelling of ankles. Tolerating meds, no myalgias or other side effects noted. Back pain: Pt reports she continuous to experience back pain- that still effects her walking. Received her flu vaccine on 1/03/20. She has an eye exam scheduled in March. Review of Systems   All other systems reviewed and are negative. Physical Exam  Constitutional:       Appearance: Normal appearance. HENT:      Right Ear: Hearing, tympanic membrane and external ear normal.      Left Ear: Hearing, tympanic membrane and external ear normal.      Mouth/Throat:      Mouth: Mucous membranes are moist.      Pharynx: Oropharynx is clear. Cardiovascular:      Rate and Rhythm: Normal rate and regular rhythm. Pulmonary:      Effort: Pulmonary effort is normal.      Breath sounds: Normal breath sounds and air entry. Musculoskeletal: Normal range of motion. Skin:     General: Skin is warm and dry. Neurological:      General: No focal deficit present. Mental Status: She is alert and oriented to person, place, and time. Psychiatric:         Mood and Affect: Mood normal.         Behavior: Behavior normal.         ASSESSMENT and PLAN  Diagnoses and all orders for this visit:    1. Controlled type 2 diabetes mellitus without complication, unspecified whether long term insulin use (HCC)  Sugars presumed stable. Continue to follow diabetic diet and monitor sugars. She will f/u with her DM specialist in Feb.     2. Mixed hyperlipidemia  Stable, patient is tolerating medications, no myalgias. I do not recommend any change in medications. 3. Essential hypertension with goal blood pressure less than 140/90  BP is at goal. I do not recommend any change in medications. 4. SOB (shortness of breath)  Stable and well-managed with drastic noted improvement. Continues on Chlorthalidone 25 mg and Aldactone 25 mg daily. Will continue to monitor for improvements or changes. Continues to f/u with cardiologist.     Lab results and schedule of future lab studies reviewed with patient. Reviewed diet, exercise and weight control. Written by Jose Juan Neil, as dictated by Holger Alfaro MD.     Current diagnosis and concerns discussed with pt at length. Understands risks and benefits or current treatment plan and medications and accepts the treatment and medication with any possible risks. Pt asks appropriate questions which were answered. Pt instructed to call with any concerns or problems. This note will not be viewable in [address]

## 2020-01-13 NOTE — PROGRESS NOTES
This is the Subsequent Medicare Annual Wellness Exam, performed 12 months or more after the Initial AWV or the last Subsequent AWV    I have reviewed the patient's medical history in detail and updated the computerized patient record. History     Patient Active Problem List   Diagnosis Code    Hyperlipidemia E78.5    HTN (hypertension) I10    Diabetes ([address] E11.9    DM type 2 (diabetes mellitus, type 2) ([address] E11.9    Hypertension I10     ischemic heart disease Z82.49    Severe obesity ([address] E66.01    Atherosclerosis I70.90    Carotid artery disease (HCC) I73.9    Mild nonproliferative diabetic retinopathy of both eyes without macular edema ([address] H93.3602    Nuclear nonsenile cataract H26.9    Open angle with borderline findings and low glaucoma risk in both eyes H40.013    Pseudophakia Z96.1    Shortness of breath R06.02     Past Medical History:   Diagnosis Date    Atherosclerosis     CT Heart Scan 8/2/18 - score of 562 is at the 90th percentile rank.  Back pain     Carotid artery disease (HCC)     Diabetes ([address]     Diastolic heart failure ([address]     12/19      ischemic heart disease     brother with heart disease     Hyperlipidemia     Hypertension       Past Surgical History:   Procedure Laterality Date    ENDOSCOPY, COLON, DIAGNOSTIC      2002 normal    HX APPENDECTOMY      HX BREAST BIOPSY Left 06/2017    neg; stereotactic bx    HX GYN      molar pregnancy    HX GYN      CHIQUI part OOphorectomy     Current Outpatient Medications   Medication Sig Dispense Refill    metoprolol succinate (TOPROL-XL) 100 mg tablet TAKE 1 TABLET BY MOUTH EVERY DAY 90 Tab 1    atorvastatin (LIPITOR) 40 mg tablet Take 1 Tab by mouth daily. 90 Tab 3    terazosin (HYTRIN) 2 mg capsule TAKE 1 CAPSULE BY MOUTH EVERY DAY 90 Cap 1    spironolactone (ALDACTONE) 25 mg tablet Take 1 Tab by mouth daily. 30 Tab 12    chlorthalidone (HYGROTEN) 25 mg tablet Take 1 Tab by mouth daily.  30 Tab 12    amLODIPine (NORVASC) 10 mg tablet TAKE 1 TABLET BY MOUTH EVERY DAY 90 Tab 0    glyBURIDE (DIABETA) 5 mg tablet Take 2 Tabs by mouth two (2) times daily (with meals). 360 Tab 1    metFORMIN (GLUCOPHAGE) 500 mg tablet Take 2 Tabs by mouth two (2) times daily (with meals). (Patient taking differently: Take 1,000 mg by mouth two (2) times daily (with meals). Indications: 2 in the morning and 1 at night) 360 Tab 1    aspirin 81 mg chewable tablet TAKE 1 TABLET BY MOUTH EVERY DAY 90 Tab 3    ONETOUCH ULTRA BLUE TEST STRIP strip TEST BLOOD SUGAR 3 TIMES A  Strip 3    insulin aspart U-100 (NOVOLOG) 100 unit/mL inpn 20 units at breakfast, 26 units at lunch, 26 units at dinner. ( please give one month) (Patient taking differently: 75-50 units at breakfast,     25 each units at night  ( please give one month)) 1 Pen 3     No Known Allergies    Family History   Problem Relation Age of Onset    Cancer Mother         breast in her 62s    Breast Cancer Mother 61    Heart Failure Father         after heart transplant    Hypertension Brother     Heart Attack Brother     Hypertension Brother      Social History     Tobacco Use    Smoking status: Never Smoker    Smokeless tobacco: Never Used   Substance Use Topics    Alcohol use: No       Depression Risk Factor Screening:     3 most recent PHQ Screens 1/13/2020   Little interest or pleasure in doing things Not at all   Feeling down, depressed, irritable, or hopeless Not at all   Total Score PHQ 2 0       Alcohol Risk Factor Screening:   Do you average 1 drink per night or more than 7 drinks a week:  No    On any one occasion in the past three months have you have had more than 3 drinks containing alcohol:  No      Functional Ability and Level of Safety:   Hearing: Hearing is good. Activities of Daily Living: The home contains: no safety equipment.   Patient does total self care    Ambulation: with no difficulty    Fall Risk:  Fall Risk Assessment, last 12 mths 1/13/2020   Able to walk? Yes   Fall in past 12 months? No       Abuse Screen:  Patient is not abused    Cognitive Screening   Has your family/caregiver stated any concerns about your memory: no  Cognitive Screening: Normal - MMSE (Mini Mental Status Exam)    Patient Care Team   Patient Care Team:  Lucy Lugo MD as PCP - Lance Zapata MD as PCP - [facility] Empaneled Provider  Raghavendra Hilario MD (Breast Surgery)  Candice Acosta MD (Cardiology)    Assessment/Plan   Education and counseling provided:  Are appropriate based on today's review and evaluation    Diagnoses and all orders for this visit:    1. Controlled type 2 diabetes mellitus without complication, unspecified whether long term insulin use ([address]    2. Mixed hyperlipidemia    3. Essential hypertension with goal blood pressure less than 140/90    4. SOB (shortness of breath)        Health Maintenance Due   Topic Date Due    GLAUCOMA SCREENING Q2Y  07/11/2019    EYE EXAM RETINAL OR DILATED  07/11/2019    HEMOGLOBIN A1C Q6M  12/14/2019    MICROALBUMIN Q1  12/17/2019    MEDICARE YEARLY EXAM  12/18/2019   This note will not be viewable in MyChart.

## 2020-01-13 NOTE — PATIENT INSTRUCTIONS
Medicare Wellness Visit, Female     The best way to live healthy is to have a lifestyle where you eat a well-balanced diet, exercise regularly, limit alcohol use, and quit all forms of tobacco/nicotine, if applicable. Regular preventive services are another way to keep healthy. Preventive services (vaccines, screening tests, monitoring & exams) can help personalize your care plan, which helps you manage your own care. Screening tests can find health problems at the earliest stages, when they are easiest to treat. [facility] follows the current, evidence-based guidelines published by the [facility] ([facility]) when recommending preventive services for our patients. Because we follow these guidelines, sometimes recommendations change over time as research supports it. (For example, mammograms used to be recommended annually. Even though Medicare will still pay for an annual mammogram, the newer guidelines recommend a mammogram every two years for women of average risk). Of course, you and your doctor may decide to screen more often for some diseases, based on your risk and your co-morbidities (chronic disease you are already diagnosed with). Preventive services for you include:  - Medicare offers their members a free annual wellness visit, which is time for you and your primary care provider to discuss and plan for your preventive service needs. Take advantage of this benefit every year!  -All adults over the age of 72 should receive the recommended pneumonia vaccines. Current USPSTF guidelines recommend a series of two vaccines for the best pneumonia protection.   -All adults should have a flu vaccine yearly and a tetanus vaccine every 10 years.   -All adults age 48 and older should receive the shingles vaccines (series of two vaccines).       -All adults age 38-68 who are overweight should have a diabetes screening test once every three years.   -All adults born between [birth year] and 1967 should be screened once for Hepatitis C.  -Other screening tests and preventive services for persons with diabetes include: an eye exam to screen for diabetic retinopathy, a kidney function test, a foot exam, and stricter control over your cholesterol.   -Cardiovascular screening for adults with routine risk involves an electrocardiogram (ECG) at intervals determined by your doctor.   -Colorectal cancer screenings should be done for adults age 54-65 with no increased risk factors for colorectal cancer. There are a number of acceptable methods of screening for this type of cancer. Each test has its own benefits and drawbacks. Discuss with your doctor what is most appropriate for you during your annual wellness visit. The different tests include: colonoscopy (considered the best screening method), a fecal occult blood test, a fecal DNA test, and sigmoidoscopy.    -A bone mass density test is recommended when a woman turns 65 to screen for osteoporosis. This test is only recommended one time, as a screening. Some providers will use this same test as a disease monitoring tool if you already have osteoporosis. -Breast cancer screenings are recommended every other year for women of normal risk, age 54-69.  -Cervical cancer screenings for women over age 72 are only recommended with certain risk factors.      Here is a list of your current Health Maintenance items (your personalized list of preventive services) with a due date:  Health Maintenance Due   Topic Date Due    Glaucoma Screening   07/11/2019    Eye Exam  07/11/2019    Hemoglobin A1C    12/14/2019    Albumin Urine Test  12/17/2019    Annual Well Visit  12/18/2019

## 2020-02-16 LAB
ALBUMIN SERPL-MCNC: 4.1 G/DL (ref 3.8–4.8)
ALBUMIN/GLOB SERPL: 1.5 {RATIO} (ref 1.2–2.2)
ALP SERPL-CCNC: 100 IU/L (ref 39–117)
ALT SERPL-CCNC: 37 IU/L (ref 0–32)
AST SERPL-CCNC: 26 IU/L (ref 0–40)
BILIRUB SERPL-MCNC: 0.5 MG/DL (ref 0–1.2)
BUN SERPL-MCNC: 20 MG/DL (ref 8–27)
BUN/CREAT SERPL: 22 (ref 12–28)
CALCIUM SERPL-MCNC: 10 MG/DL (ref 8.7–10.3)
CHLORIDE SERPL-SCNC: 99 MMOL/L (ref 96–106)
CHOLEST SERPL-MCNC: 109 MG/DL (ref 100–199)
CO2 SERPL-SCNC: 24 MMOL/L (ref 20–29)
CREAT SERPL-MCNC: 0.93 MG/DL (ref 0.57–1)
GLOBULIN SER CALC-MCNC: 2.8 G/DL (ref 1.5–4.5)
GLUCOSE SERPL-MCNC: 307 MG/DL (ref 65–99)
HDL SERPL-SCNC: 25.3 UMOL/L
HDLC SERPL-MCNC: 35 MG/DL
INTERPRETATION, 910389: NORMAL
LDL SERPL QN: 20.1 NM
LDL SERPL-SCNC: 804 NMOL/L
LDL SMALL SERPL-SCNC: 468 NMOL/L
LDLC SERPL CALC-MCNC: 50 MG/DL (ref 0–99)
LP-IR SCORE SERPL: 74
POTASSIUM SERPL-SCNC: 4.8 MMOL/L (ref 3.5–5.2)
PROT SERPL-MCNC: 6.9 G/DL (ref 6–8.5)
SODIUM SERPL-SCNC: 138 MMOL/L (ref 134–144)
SPECIMEN STATUS REPORT, ROLRST: NORMAL
TRIGL SERPL-MCNC: 121 MG/DL (ref 0–149)

## 2020-02-28 DIAGNOSIS — I10 ESSENTIAL HYPERTENSION WITH GOAL BLOOD PRESSURE LESS THAN 140/90: ICD-10-CM

## 2020-02-28 RX ORDER — AMLODIPINE BESYLATE 10 MG/1
TABLET ORAL
Qty: 90 TAB | Refills: 0 | Status: SHIPPED | OUTPATIENT
Start: 2020-02-28 | End: 2020-05-21

## 2020-04-17 DIAGNOSIS — E11.9 CONTROLLED TYPE 2 DIABETES MELLITUS WITHOUT COMPLICATION, UNSPECIFIED WHETHER LONG TERM INSULIN USE (HCC): ICD-10-CM

## 2020-04-17 RX ORDER — GLYBURIDE 5 MG/1
TABLET ORAL
Qty: 360 TAB | Refills: 1 | Status: SHIPPED | OUTPATIENT
Start: 2020-04-17 | End: 2021-08-03 | Stop reason: SDUPTHER

## 2020-05-21 DIAGNOSIS — I10 ESSENTIAL HYPERTENSION WITH GOAL BLOOD PRESSURE LESS THAN 140/90: ICD-10-CM

## 2020-05-21 DIAGNOSIS — Z82.49 FH ISCHEMIC HEART DISEASE: ICD-10-CM

## 2020-05-21 RX ORDER — AMLODIPINE BESYLATE 10 MG/1
TABLET ORAL
Qty: 90 TAB | Refills: 0 | Status: SHIPPED | OUTPATIENT
Start: 2020-05-21 | End: 2020-08-18

## 2020-05-21 RX ORDER — GUAIFENESIN 100 MG/5ML
LIQUID (ML) ORAL
Qty: 90 TAB | Refills: 3 | Status: SHIPPED | OUTPATIENT
Start: 2020-05-21 | End: 2021-03-01

## 2020-06-11 ENCOUNTER — OFFICE VISIT (OUTPATIENT)
Dept: CARDIOLOGY CLINIC | Age: 65
End: 2020-06-11

## 2020-06-11 VITALS
SYSTOLIC BLOOD PRESSURE: 126 MMHG | OXYGEN SATURATION: 97 % | DIASTOLIC BLOOD PRESSURE: 64 MMHG | HEART RATE: 82 BPM | BODY MASS INDEX: 36.32 KG/M2 | HEIGHT: 66 IN | WEIGHT: 226 LBS

## 2020-06-11 DIAGNOSIS — E78.2 MIXED HYPERLIPIDEMIA: ICD-10-CM

## 2020-06-11 DIAGNOSIS — I77.9 BILATERAL CAROTID ARTERY DISEASE, UNSPECIFIED TYPE (HCC): Primary | ICD-10-CM

## 2020-06-11 DIAGNOSIS — I10 ESSENTIAL HYPERTENSION: ICD-10-CM

## 2020-06-11 NOTE — PROGRESS NOTES
Sheri Gibbs MD. [facility] [place]              Patient: Edgar Wills  : 1955      Today's Date: 2020          HISTORY OF PRESENT ILLNESS:     History of Present Illness:  She is doing well overall. Working on her DM and weight. Breathing has been OK. No CP. No orthopnea or edema. PAST MEDICAL HISTORY:     Past Medical History:   Diagnosis Date    Atherosclerosis     CT Heart Scan 18 - score of 562 is at the 90th percentile rank.  Back pain     Carotid artery disease (HCC)     Diabetes ([address]     Diastolic heart failure ([address]           ischemic heart disease     brother with heart disease     Hyperlipidemia     Hypertension        Past Surgical History:   Procedure Laterality Date    ENDOSCOPY, COLON, DIAGNOSTIC       normal    HX APPENDECTOMY      HX BREAST BIOPSY Left 2017    neg; stereotactic bx    HX GYN      molar pregnancy    HX GYN      CHIQUI part OOphorectomy           MEDICATIONS:     Current Outpatient Medications   Medication Sig Dispense Refill    amLODIPine (NORVASC) 10 mg tablet TAKE 1 TABLET BY MOUTH EVERY DAY 90 Tab 0    aspirin 81 mg chewable tablet CHEW 1 TABLET BY MOUTH DAILY 90 Tab 3    glyBURIDE (DIABETA) 5 mg tablet TAKE 2 TABLETS BY MOUTH TWICE A DAY WITH FOOD 360 Tab 1    metoprolol succinate (TOPROL-XL) 100 mg tablet TAKE 1 TABLET BY MOUTH EVERY DAY 90 Tab 1    atorvastatin (LIPITOR) 40 mg tablet Take 1 Tab by mouth daily. 90 Tab 3    terazosin (HYTRIN) 2 mg capsule TAKE 1 CAPSULE BY MOUTH EVERY DAY 90 Cap 1    spironolactone (ALDACTONE) 25 mg tablet Take 1 Tab by mouth daily. 30 Tab 12    chlorthalidone (HYGROTEN) 25 mg tablet Take 1 Tab by mouth daily. 30 Tab 12    metFORMIN (GLUCOPHAGE) 500 mg tablet Take 2 Tabs by mouth two (2) times daily (with meals). (Patient taking differently: Take 1,000 mg by mouth two (2) times daily (with meals).  Indications: 2 in the morning and 1 at night) 360 Tab 1    ONETOUCH ULTRA BLUE TEST STRIP strip TEST BLOOD SUGAR 3 TIMES A  Strip 3    insulin aspart U-100 (NOVOLOG) 100 unit/mL inpn 20 units at breakfast, 26 units at lunch, 26 units at dinner. ( please give one month) (Patient taking differently: 80-50 units at breakfast,     30 each units at night  ( please give one month)) 1 Pen 3       No Known Allergies          SOCIAL HISTORY:     Social History     Tobacco Use    Smoking status: Never Smoker    Smokeless tobacco: Never Used   Substance Use Topics    Alcohol use: No    Drug use: No         FAMILY HISTORY:     Family History   Problem Relation Age of Onset    Cancer Mother         breast in her 62s    Breast Cancer Mother 61    Heart Failure Father         after heart transplant    Hypertension Brother     Heart Attack Brother     Hypertension Brother             REVIEW OF SYMPTOMS:      Review of Symptoms:  Constitutional: Negative for fever, chills  HEENT: Negative for nosebleeds, tinnitus, and vision changes. Respiratory:  + LOMBARDI./cough  Cardiovascular: Negative for orthopnea, claudication, syncope, and PND. Gastrointestinal: Negative for abdominal pain, diarrhea, melena. Genitourinary: Negative for dysuria  Musculoskeletal: Negative for myalgias. Skin: Negative for rash  Heme: No problems bleeding. Neurological: Negative for speech change and focal weakness.            PHYSICAL EXAM:      Physical Exam:  Visit Vitals  /64 (BP 1 Location: Left arm, BP Patient Position: Sitting)   Pulse 82   Ht 5' 6\" (1.676 m)   Wt 226 lb (102.5 kg)   LMP 01/01/1995 (Within Months)   SpO2 97%   BMI 36.48 kg/m²          Patient appears generally well, mood and affect are appropriate and pleasant. HEENT:  Hearing intact, non-icteric, normocephalic, atraumatic. Neck Exam: Supple, No JVD  Lung Exam: Clear to auscultation, even breath sounds.    Cardiac Exam: Regular rate and rhythm with brief 2/6 systolic murmur, nml S2  Abdomen: Soft, non-tender, obese   Extremities: Moves all ext well. No lower extremity edema. Vascular: 2+ DP's  Psych: Appropriate affect  Neuro - Grossly intact           LABS / OTHER STUDIES:               Labs 12/8/19 - Hgb 10.6, D-Dimer nml, proBNP 695, Cr 0.6, K 3.5, trop nml        Component      Latest Ref Rng & Units 12/31/2019 12/18/2019           9:43 AM  8:58 AM   NT pro-BNP      0 - 301 pg/mL 204 231      Component      Latest Ref Rng & Units 2/14/2020           8:51 AM   LDL-P      <1,000 nmol/L 804   LDL-C      0 - 99 mg/dL 50   HDL-C      >39 mg/dL 35 (L)   Triglycerides      0 - 149 mg/dL 121   Cholesterol, total      100 - 199 mg/dL 109   HDL-P (Total)      >=30.5 umol/L 25.3 (L)   Small LDL-P      <=527 nmol/L 468   LDL size      >20.5 nm 20.1 (L)   LP-IR SCORE      <=45 74 (H)     Lab Results   Component Value Date/Time    Sodium 138 02/14/2020 08:51 AM    Potassium 4.8 02/14/2020 08:51 AM    Chloride 99 02/14/2020 08:51 AM    CO2 24 02/14/2020 08:51 AM    Glucose 307 (H) 02/14/2020 08:51 AM    BUN 20 02/14/2020 08:51 AM    Creatinine 0.93 02/14/2020 08:51 AM    BUN/Creatinine ratio 22 02/14/2020 08:51 AM    GFR est AA 74 02/14/2020 08:51 AM    GFR est non-AA 64 02/14/2020 08:51 AM    Calcium 10.0 02/14/2020 08:51 AM    Bilirubin, total 0.5 02/14/2020 08:51 AM    Alk. phosphatase 100 02/14/2020 08:51 AM    Protein, total 6.9 02/14/2020 08:51 AM    Albumin 4.1 02/14/2020 08:51 AM    A-G Ratio 1.5 02/14/2020 08:51 AM    ALT (SGPT) 37 (H) 02/14/2020 08:51 AM             CARDIAC DIAGNOSTICS:      Cardiac Evaluation Includes:  EKG 7/5/18 - NSR, non-specific T wave abn   EKG 12/13/19 - NSR, non-specific T wave abn         CT Heart Scan 8/2/18 - score of 562 is at the 90th percentile rank. Exercise Cardiolite 8/18 - 7 METS, normal MPI, LVEF 73%     Carotid Doppler 3/26/19 - 10-49% VERO stenosis;  Complete occlusion of LICA     Echo 0/46/94 - LVEF 60-65%, mild-mod MR, AV sclerosis      CXR 12/8/19 ([facility]) - small bilat pleural effusions      Echo 1/3/20 - LVEF 60-65%, mild LAE, mild-mod MR         ASSESSMENT AND PLAN:      Assessment and Plan:  1) SOB  - On 12/8/19 -  SOB, pleural effusions and elevated proBNP suggestive of CHF - At that time increased her diuretics ---> stop valsartan-HCTZ (she has not had this in a week) and instead start her on chlorthalidone 25 mg daily (half dose initially) and aldactone 25 mg daily   - On FU 1/3/20 -- proBNP has been normal and now she looks dry according to labs (Cr elevated). She is feeling better on additional diuretics and breathing is much better. Will continue meds and asked her to stay hydrated with water. Encourage exercise. - On 6/11/20 - She is doing well overall. Breathing is good. Continue meds. Labs followed by Endocrine (Dr. Kristie Mai). Recheck echo in 6 months for her murmur.    2) Likely sleep apnea - she snores  - consider a sleep study - she prefers to hold off  - On 6/11/20 - she says she is sleeping well      3) Carotid Artery disease  - Carotid Doppler 3/26/19 - 10-49% VERO stenosis; Complete occlusion of LICA  - cont statin and ASA  - check yearly dopplers --> recheck next visit      4) Dyslipidemia  - she should be on a potent statin given vascular disease   ---> continue atorvastatin at 40 mg daily   - recent lipids OK      5) DM  - managed by Dr. Kristie Mai (Endocrine)      6) HTN  - BP OK  - continue  meds      7) See me in 6 months with carotid dopplers.   Patient expressed understanding of the plan - questions were answered.      She is from [facility]. [employer] for 15+ years.  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  [address]  [facility], [address] [place] [address]  Ph: [phone]                                [phone]

## 2020-06-11 NOTE — PROGRESS NOTES
India Martin is a 77 y.o. female    Chief Complaint   Patient presents with    Cholesterol Problem    Hypertension    Follow-up     Athero. Chest pain No    SOB patient states some SOB when walking    Dizziness No    Swelling No    Refills spironolactone and atorvastatin    Visit Vitals  /64 (BP 1 Location: Left arm, BP Patient Position: Sitting)   Pulse 82   Ht 5' 6\" (1.676 m)   Wt 226 lb (102.5 kg)   LMP 01/01/1995 (Within Months)   SpO2 97%   BMI 36.48 kg/m²       1. Have you been to the ER, urgent care clinic since your last visit? Hospitalized since your last visit? no    2. Have you seen or consulted any other health care providers outside of the [address] [place] [address] since your last visit? Include any pap smears or colon screening.   no

## 2020-07-14 ENCOUNTER — OFFICE VISIT (OUTPATIENT)
Dept: INTERNAL MEDICINE CLINIC | Age: 65
End: 2020-07-14

## 2020-07-14 VITALS
BODY MASS INDEX: 36.8 KG/M2 | OXYGEN SATURATION: 97 % | WEIGHT: 228 LBS | TEMPERATURE: 98.2 F | SYSTOLIC BLOOD PRESSURE: 130 MMHG | DIASTOLIC BLOOD PRESSURE: 78 MMHG | HEART RATE: 79 BPM | RESPIRATION RATE: 16 BRPM

## 2020-07-14 DIAGNOSIS — H91.8X9 OTHER SPECIFIED FORMS OF HEARING LOSS, UNSPECIFIED LATERALITY: ICD-10-CM

## 2020-07-14 DIAGNOSIS — I10 ESSENTIAL HYPERTENSION WITH GOAL BLOOD PRESSURE LESS THAN 140/90: ICD-10-CM

## 2020-07-14 DIAGNOSIS — E11.65 TYPE 2 DIABETES MELLITUS WITH HYPERGLYCEMIA, WITH LONG-TERM CURRENT USE OF INSULIN (HCC): Primary | ICD-10-CM

## 2020-07-14 DIAGNOSIS — E78.2 MIXED HYPERLIPIDEMIA: ICD-10-CM

## 2020-07-14 DIAGNOSIS — Z79.4 TYPE 2 DIABETES MELLITUS WITH HYPERGLYCEMIA, WITH LONG-TERM CURRENT USE OF INSULIN (HCC): Primary | ICD-10-CM

## 2020-07-14 LAB — HBA1C MFR BLD HPLC: 8.4 %

## 2020-07-14 NOTE — PROGRESS NOTES
HISTORY OF PRESENT ILLNESS  Wendy Friedman is a 77 y.o. female. HPI  Hypertension ROS: taking medications as instructed, no medication side effects noted, no TIA's, no chest pain on exertion, no dyspnea on exertion, no swelling of ankles. New concerns: BP in office today is 159/83. Pt notes that her BP is stable at home with values near 130/80. Pt notes that she has not been walking regularly due to back pain. Diabetic Review of Systems - further diabetic ROS: no polyuria or polydipsia, no chest pain, dyspnea or TIA's, no numbness, tingling or pain in extremities. Other symptoms and concerns: Pt's last a1c was 7.5 on 6/14/19. Continues on Metformin and glyburide. Hyperlipidemia:  Cardiovascular risk analysis - 77 y.o. female LDL goal is under 100. ROS: taking medications as instructed, no medication side effects noted, no TIA's, no chest pain on exertion, no dyspnea on exertion, no swelling of ankles. Tolerating meds, no myalgias or other side effects noted. Difficulty hearing: Pt c/o muffled hearing. She suspects build up cerumen. SOB: Pt reports improvement in SOB after following with cardiology. 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 Review of Systems   HENT:        Muffled hearing    Musculoskeletal: Positive for back pain. All other systems reviewed and are negative. Physical Exam  Vitals signs and nursing note reviewed. Constitutional:       Appearance: Normal appearance. She is normal weight. HENT:      Head: Normocephalic and atraumatic. Right Ear: Tympanic membrane, ear canal and external ear normal.      Left Ear: Tympanic membrane, ear canal and external ear normal.      Nose: Nose normal.      Mouth/Throat:      Mouth: Mucous membranes are dry. Pharynx: Oropharynx is clear. Neck:      Musculoskeletal: Normal range of motion and neck supple. Cardiovascular:      Rate and Rhythm: Normal rate and regular rhythm.       Pulses: Normal pulses. Heart sounds: Murmur present. Pulmonary:      Effort: Pulmonary effort is normal.      Breath sounds: Normal breath sounds. Abdominal:      General: Abdomen is flat. Palpations: Abdomen is soft. Musculoskeletal: Normal range of motion. Skin:     General: Skin is warm and dry. Neurological:      General: No focal deficit present. Mental Status: She is alert and oriented to person, place, and time. Mental status is at baseline. Psychiatric:         Mood and Affect: Mood normal.         Behavior: Behavior normal.         Thought Content: Thought content normal.         Judgment: Judgment normal.         ASSESSMENT and PLAN  Diagnoses and all orders for this visit:    1. Type 2 diabetes mellitus with hyperglycemia, with long-term current use of insulin (HCC)  Sugars presumed stable. Continue to follow diabetic diet and monitor sugars. Had discussion about the importance of exercise and healthy diet on pt's health. 2. Essential hypertension with goal blood pressure less than 140/90  BP is at goal. I do not recommend any change in medications. Had discussion about the importance of exercise and healthy diet on pt's health. 3. Mixed hyperlipidemia  Presumed stable. Patient is tolerating medications with no myalgias. I do not recommend any change in treatment plan. Had discussion about the importance of exercise and healthy diet on pt's health. 4. Other specified forms of hearing loss, unspecified laterality  Evaluated pt's ears and informed her there was no cerumen build up. Will continue to monitor for improvements or changes. Lab results and schedule of future lab studies reviewed with patient. Reviewed diet, exercise and weight control. Written by Tho Mccabe, as dictated by Olivia Mehta MD.     Current diagnosis and concerns discussed with pt at length.  Understands risks and benefits or current treatment plan and medications and accepts the treatment and medication with any possible risks. Pt asks appropriate questions which were answered. Pt instructed to call with any concerns or problems.

## 2020-08-18 DIAGNOSIS — I10 ESSENTIAL HYPERTENSION WITH GOAL BLOOD PRESSURE LESS THAN 140/90: ICD-10-CM

## 2020-08-18 RX ORDER — METOPROLOL SUCCINATE 100 MG/1
TABLET, EXTENDED RELEASE ORAL
Qty: 90 TAB | Refills: 1 | Status: SHIPPED | OUTPATIENT
Start: 2020-08-18 | End: 2020-12-18 | Stop reason: SDUPTHER

## 2020-08-18 RX ORDER — AMLODIPINE BESYLATE 10 MG/1
TABLET ORAL
Qty: 90 TAB | Refills: 0 | Status: SHIPPED | OUTPATIENT
Start: 2020-08-18 | End: 2020-08-29

## 2020-08-18 RX ORDER — TERAZOSIN 2 MG/1
CAPSULE ORAL
Qty: 90 CAP | Refills: 0 | Status: SHIPPED | OUTPATIENT
Start: 2020-08-18 | End: 2020-12-18 | Stop reason: SDUPTHER

## 2020-08-28 DIAGNOSIS — I10 ESSENTIAL HYPERTENSION WITH GOAL BLOOD PRESSURE LESS THAN 140/90: ICD-10-CM

## 2020-08-29 RX ORDER — AMLODIPINE BESYLATE 10 MG/1
TABLET ORAL
Qty: 90 TAB | Refills: 0 | Status: SHIPPED | OUTPATIENT
Start: 2020-08-29 | End: 2020-12-18 | Stop reason: SDUPTHER

## 2020-10-12 ENCOUNTER — TRANSCRIBE ORDER (OUTPATIENT)
Dept: SCHEDULING | Age: 65
End: 2020-10-12

## 2020-10-12 DIAGNOSIS — Z12.31 VISIT FOR SCREENING MAMMOGRAM: Primary | ICD-10-CM

## 2020-11-11 DIAGNOSIS — E78.2 MIXED HYPERLIPIDEMIA: ICD-10-CM

## 2020-11-18 RX ORDER — ATORVASTATIN CALCIUM 40 MG/1
TABLET, FILM COATED ORAL
Qty: 90 TAB | Refills: 1 | Status: SHIPPED | OUTPATIENT
Start: 2020-11-18 | End: 2020-12-18 | Stop reason: SDUPTHER

## 2020-11-18 RX ORDER — SPIRONOLACTONE 25 MG/1
TABLET ORAL
Qty: 90 TAB | Refills: 1 | Status: SHIPPED | OUTPATIENT
Start: 2020-11-18 | End: 2020-12-18 | Stop reason: SDUPTHER

## 2020-11-18 NOTE — TELEPHONE ENCOUNTER
Refill per VO of Dr. Aguilar Reach:  Last appt: 6/11/2020  Future Appointments   Date Time Provider Deonte Christianson   12/8/2020  1:30 PM [facility]   12/18/2020  9:00 AM VASCULAR, CLINT [facility] BS AMB   12/18/2020 10:00 AM ECHO, [facility]INT [facility] BS AMB   12/18/2020 11:40 AM Janis Salas MD [facility] BS AMB   1/13/2021  1:00 PM Jossy Bajwa MD [facility] BS AMB       Requested Prescriptions     Pending Prescriptions Disp Refills    spironolactone (ALDACTONE) 25 mg tablet [Pharmacy Med Name: SPIRONOLACTONE 25 MG TABLET] 90 Tab 4     Sig: TAKE 1 TABLET BY MOUTH EVERY DAY    atorvastatin (LIPITOR) 40 mg tablet [Pharmacy Med Name: ATORVASTATIN 40 MG TABLET] 90 Tab 3     Sig: TAKE 1 TABLET BY MOUTH EVERY DAY

## 2020-12-08 ENCOUNTER — HOSPITAL ENCOUNTER (OUTPATIENT)
Dept: MAMMOGRAPHY | Age: 65
Discharge: HOME | End: 2020-12-08
Attending: INTERNAL MEDICINE
Payer: MEDICARE

## 2020-12-08 DIAGNOSIS — Z12.31 VISIT FOR SCREENING MAMMOGRAM: ICD-10-CM

## 2020-12-08 PROCEDURE — 77067 SCR MAMMO BI INCL CAD: CPT

## 2020-12-18 ENCOUNTER — OFFICE VISIT (OUTPATIENT)
Dept: CARDIOLOGY CLINIC | Age: 65
End: 2020-12-18
Payer: MEDICARE

## 2020-12-18 ENCOUNTER — ANCILLARY PROCEDURE (OUTPATIENT)
Dept: CARDIOLOGY CLINIC | Age: 65
End: 2020-12-18
Payer: MEDICARE

## 2020-12-18 VITALS
HEIGHT: 66 IN | SYSTOLIC BLOOD PRESSURE: 134 MMHG | WEIGHT: 229 LBS | DIASTOLIC BLOOD PRESSURE: 76 MMHG | BODY MASS INDEX: 36.8 KG/M2

## 2020-12-18 VITALS
BODY MASS INDEX: 36.8 KG/M2 | SYSTOLIC BLOOD PRESSURE: 134 MMHG | DIASTOLIC BLOOD PRESSURE: 76 MMHG | HEIGHT: 66 IN | WEIGHT: 229 LBS

## 2020-12-18 VITALS
BODY MASS INDEX: 36.8 KG/M2 | WEIGHT: 229 LBS | DIASTOLIC BLOOD PRESSURE: 76 MMHG | HEIGHT: 66 IN | SYSTOLIC BLOOD PRESSURE: 134 MMHG | HEART RATE: 65 BPM

## 2020-12-18 DIAGNOSIS — R06.02 SOB (SHORTNESS OF BREATH): ICD-10-CM

## 2020-12-18 DIAGNOSIS — E78.2 MIXED HYPERLIPIDEMIA: ICD-10-CM

## 2020-12-18 DIAGNOSIS — I77.9 BILATERAL CAROTID ARTERY DISEASE, UNSPECIFIED TYPE (HCC): Primary | ICD-10-CM

## 2020-12-18 DIAGNOSIS — I10 ESSENTIAL HYPERTENSION WITH GOAL BLOOD PRESSURE LESS THAN 140/90: ICD-10-CM

## 2020-12-18 DIAGNOSIS — I77.9 BILATERAL CAROTID ARTERY DISEASE, UNSPECIFIED TYPE (HCC): ICD-10-CM

## 2020-12-18 DIAGNOSIS — I10 ESSENTIAL HYPERTENSION: ICD-10-CM

## 2020-12-18 LAB
ECHO AO ROOT DIAM: 3.35 CM
ECHO AV MEAN GRADIENT: 8.23 MMHG
ECHO AV PEAK GRADIENT: 14.05 MMHG
ECHO AV PEAK VELOCITY: 187.41 CM/S
ECHO AV VTI: 37.06 CM
ECHO LA AREA 4C: 19.67 CM2
ECHO LA MAJOR AXIS: 4.16 CM
ECHO LA MINOR AXIS: 1.96 CM
ECHO LA VOL 2C: 45.01 ML (ref 22–52)
ECHO LA VOL 4C: 58.59 ML (ref 22–52)
ECHO LA VOL BP: 55.13 ML (ref 22–52)
ECHO LA VOL/BSA BIPLANE: 26.02 ML/M2 (ref 16–28)
ECHO LA VOLUME INDEX A2C: 21.24 ML/M2 (ref 16–28)
ECHO LA VOLUME INDEX A4C: 27.65 ML/M2 (ref 16–28)
ECHO LV E' LATERAL VELOCITY: 7.55 CM/S
ECHO LV E' SEPTAL VELOCITY: 4.78 CM/S
ECHO LV EDV A2C: 62.22 ML
ECHO LV EDV A4C: 78.13 ML
ECHO LV EDV BP: 70.94 ML (ref 56–104)
ECHO LV EDV INDEX A4C: 36.9 ML/M2
ECHO LV EDV INDEX BP: 33.5 ML/M2
ECHO LV EDV NDEX A2C: 29.4 ML/M2
ECHO LV EJECTION FRACTION A2C: 62 PERCENT
ECHO LV EJECTION FRACTION A4C: 61 PERCENT
ECHO LV EJECTION FRACTION BIPLANE: 61.7 PERCENT (ref 55–100)
ECHO LV ESV A2C: 23.91 ML
ECHO LV ESV A4C: 30.81 ML
ECHO LV ESV BP: 27.15 ML (ref 19–49)
ECHO LV ESV INDEX A2C: 11.3 ML/M2
ECHO LV ESV INDEX A4C: 14.5 ML/M2
ECHO LV ESV INDEX BP: 12.8 ML/M2
ECHO LV INTERNAL DIMENSION DIASTOLIC: 4.32 CM (ref 3.9–5.3)
ECHO LV INTERNAL DIMENSION SYSTOLIC: 2.66 CM
ECHO LV IVSD: 1.32 CM (ref 0.6–0.9)
ECHO LV MASS 2D: 215.2 G (ref 67–162)
ECHO LV MASS INDEX 2D: 101.6 G/M2 (ref 43–95)
ECHO LV POSTERIOR WALL DIASTOLIC: 1.33 CM (ref 0.6–0.9)
ECHO LVOT PEAK GRADIENT: 3.66 MMHG
ECHO LVOT PEAK VELOCITY: 95.59 CM/S
ECHO LVOT VTI: 20.97 CM
ECHO MV A VELOCITY: 102.03 CM/S
ECHO MV AREA PHT: 3.17 CM2
ECHO MV E DECELERATION TIME (DT): 239.07 MS
ECHO MV E VELOCITY: 78.19 CM/S
ECHO MV E/A RATIO: 0.77
ECHO MV E/E' LATERAL: 10.36
ECHO MV E/E' RATIO (AVERAGED): 13.36
ECHO MV E/E' SEPTAL: 16.36
ECHO MV PRESSURE HALF TIME (PHT): 69.33 MS
ECHO RV TAPSE: 1.91 CM (ref 1.5–2)
LA VOL DISK BP: 52.21 ML (ref 22–52)
LEFT CCA DIST DIAS: 5.4 CENTIMETER/SECOND
LEFT CCA DIST SYS: 45.8 CENTIMETER/SECOND
LEFT CCA PROX DIAS: 0 CENTIMETER/SECOND
LEFT CCA PROX SYS: 65.2 CENTIMETER/SECOND
LEFT ECA DIAS: 5.7 CENTIMETER/SECOND
LEFT ECA SYS: 61.9 CENTIMETER/SECOND
LEFT ICA DIST DIAS: 0 CM/S
LEFT ICA DIST SYS: 0 CM/S
LEFT ICA MID DIAS: 0 CM/S
LEFT ICA MID SYS: 0 CM/S
LEFT ICA PROX DIAS: 0 CM/S
LEFT ICA PROX SYS: 0 CM/S
LEFT VERTEBRAL DIAS: 9.39 CENTIMETER/SECOND
LEFT VERTEBRAL SYS: 40.6 CENTIMETER/SECOND
RIGHT CCA DIST DIAS: 17.3 CENTIMETER/SECOND
RIGHT CCA DIST SYS: 56.1 CENTIMETER/SECOND
RIGHT CCA PROX DIAS: 13 CENTIMETER/SECOND
RIGHT CCA PROX SYS: 59.8 CENTIMETER/SECOND
RIGHT ECA DIAS: 10.24 CENTIMETER/SECOND
RIGHT ECA SYS: 73.2 CENTIMETER/SECOND
RIGHT ICA DIST DIAS: 20.2 CENTIMETER/SECOND
RIGHT ICA DIST SYS: 60.4 CENTIMETER/SECOND
RIGHT ICA MID DIAS: 23.8 CENTIMETER/SECOND
RIGHT ICA MID SYS: 72.8 CENTIMETER/SECOND
RIGHT ICA PROX DIAS: 18.1 CENTIMETER/SECOND
RIGHT ICA PROX SYS: 74.9 CENTIMETER/SECOND
RIGHT ICA/CCA SYS: 1.3
RIGHT VERTEBRAL DIAS: 13.59 CENTIMETER/SECOND
RIGHT VERTEBRAL SYS: 47.2 CENTIMETER/SECOND

## 2020-12-18 PROCEDURE — 93306 TTE W/DOPPLER COMPLETE: CPT | Performed by: SPECIALIST

## 2020-12-18 PROCEDURE — 3017F COLORECTAL CA SCREEN DOC REV: CPT | Performed by: SPECIALIST

## 2020-12-18 PROCEDURE — 99214 OFFICE O/P EST MOD 30 MIN: CPT | Performed by: SPECIALIST

## 2020-12-18 PROCEDURE — 1101F PT FALLS ASSESS-DOCD LE1/YR: CPT | Performed by: SPECIALIST

## 2020-12-18 PROCEDURE — G8432 DEP SCR NOT DOC, RNG: HCPCS | Performed by: SPECIALIST

## 2020-12-18 PROCEDURE — G8752 SYS BP LESS 140: HCPCS | Performed by: SPECIALIST

## 2020-12-18 PROCEDURE — G0463 HOSPITAL OUTPT CLINIC VISIT: HCPCS | Performed by: SPECIALIST

## 2020-12-18 PROCEDURE — 93880 EXTRACRANIAL BILAT STUDY: CPT | Performed by: SPECIALIST

## 2020-12-18 PROCEDURE — G8417 CALC BMI ABV UP PARAM F/U: HCPCS | Performed by: SPECIALIST

## 2020-12-18 PROCEDURE — G8427 DOCREV CUR MEDS BY ELIG CLIN: HCPCS | Performed by: SPECIALIST

## 2020-12-18 PROCEDURE — G9899 SCRN MAM PERF RSLTS DOC: HCPCS | Performed by: SPECIALIST

## 2020-12-18 PROCEDURE — G8754 DIAS BP LESS 90: HCPCS | Performed by: SPECIALIST

## 2020-12-18 PROCEDURE — G8399 PT W/DXA RESULTS DOCUMENT: HCPCS | Performed by: SPECIALIST

## 2020-12-18 PROCEDURE — 1090F PRES/ABSN URINE INCON ASSESS: CPT | Performed by: SPECIALIST

## 2020-12-18 PROCEDURE — G8536 NO DOC ELDER MAL SCRN: HCPCS | Performed by: SPECIALIST

## 2020-12-18 RX ORDER — TERAZOSIN 2 MG/1
2 CAPSULE ORAL DAILY
Qty: 90 CAP | Refills: 3 | Status: SHIPPED | OUTPATIENT
Start: 2020-12-18 | End: 2021-08-03

## 2020-12-18 RX ORDER — METOPROLOL SUCCINATE 100 MG/1
100 TABLET, EXTENDED RELEASE ORAL DAILY
Qty: 90 TAB | Refills: 3 | Status: SHIPPED | OUTPATIENT
Start: 2020-12-18

## 2020-12-18 RX ORDER — CHLORTHALIDONE 25 MG/1
25 TABLET ORAL DAILY
Qty: 90 TAB | Refills: 3 | Status: SHIPPED | OUTPATIENT
Start: 2020-12-18

## 2020-12-18 RX ORDER — AMLODIPINE BESYLATE 10 MG/1
10 TABLET ORAL DAILY
Qty: 90 TAB | Refills: 3 | Status: SHIPPED | OUTPATIENT
Start: 2020-12-18 | End: 2021-08-03 | Stop reason: SDUPTHER

## 2020-12-18 RX ORDER — SPIRONOLACTONE 25 MG/1
25 TABLET ORAL DAILY
Qty: 90 TAB | Refills: 3 | Status: SHIPPED | OUTPATIENT
Start: 2020-12-18

## 2020-12-18 RX ORDER — ATORVASTATIN CALCIUM 40 MG/1
40 TABLET, FILM COATED ORAL DAILY
Qty: 90 TAB | Refills: 3 | Status: SHIPPED | OUTPATIENT
Start: 2020-12-18 | End: 2021-08-03 | Stop reason: SDUPTHER

## 2020-12-18 NOTE — PROGRESS NOTES
Chief Complaint   Patient presents with    Cholesterol Problem     Echo w Yung Oh    Coronary Artery Disease    Hypertension     Visit Vitals  /76   Pulse 65   Ht 5' 6\" (1.676 m)   Wt 229 lb (103.9 kg)   BMI 36.96 kg/m²     Chest pain denied   SOB denied   Palpitations denied   Swelling in hands/feet denied   Dizziness denied   Recent hospital stays denied   Refills all cardiac meds

## 2020-12-18 NOTE — PROGRESS NOTES
Carolina Woody MD. [facility] [place]              Patient: Gay Rodriguez  : 1955      Today's Date: 2020          HISTORY OF PRESENT ILLNESS:     History of Present Illness:    Doing well. No cardiac complaints. No CP or SOB. Feels well. PAST MEDICAL HISTORY:     Past Medical History:   Diagnosis Date    Atherosclerosis     CT Heart Scan 18 - score of 562 is at the 90th percentile rank.  Back pain     Carotid artery disease (HCC)     Diabetes ([address]     Diastolic heart failure ([address]           ischemic heart disease     brother with heart disease     Hyperlipidemia     Hypertension          Past Surgical History:   Procedure Laterality Date    ENDOSCOPY, COLON, DIAGNOSTIC       normal    HX APPENDECTOMY      HX BREAST BIOPSY Left 2017    neg; stereotactic bx    HX GYN      molar pregnancy    HX GYN      CHIQUI part OOphorectomy           MEDICATIONS:     Current Outpatient Medications   Medication Sig Dispense Refill    spironolactone (ALDACTONE) 25 mg tablet TAKE 1 TABLET BY MOUTH EVERY DAY 90 Tab 1    atorvastatin (LIPITOR) 40 mg tablet TAKE 1 TABLET BY MOUTH EVERY DAY 90 Tab 1    amLODIPine (NORVASC) 10 mg tablet TAKE 1 TABLET BY MOUTH EVERY DAY 90 Tab 0    terazosin (HYTRIN) 2 mg capsule TAKE 1 CAPSULE BY MOUTH EVERY DAY 90 Cap 0    metoprolol succinate (TOPROL-XL) 100 mg tablet TAKE 1 TABLET BY MOUTH EVERY DAY 90 Tab 1    aspirin 81 mg chewable tablet CHEW 1 TABLET BY MOUTH DAILY 90 Tab 3    glyBURIDE (DIABETA) 5 mg tablet TAKE 2 TABLETS BY MOUTH TWICE A DAY WITH FOOD 360 Tab 1    chlorthalidone (HYGROTEN) 25 mg tablet Take 1 Tab by mouth daily. 30 Tab 12    metFORMIN (GLUCOPHAGE) 500 mg tablet Take 2 Tabs by mouth two (2) times daily (with meals). (Patient taking differently: Take 1,000 mg by mouth two (2) times daily (with meals).  Indications: 2 in the morning and 1 at night) 360 Tab 1    ONETOUCH ULTRA BLUE TEST STRIP strip TEST BLOOD SUGAR 3 TIMES A  Strip 3    insulin aspart U-100 (NOVOLOG) 100 unit/mL inpn 20 units at breakfast, 26 units at lunch, 26 units at dinner. ( please give one month) (Patient taking differently: 80-50 units at breakfast,     30 each units at night  ( please give one month)) 1 Pen 3       No Known Allergies          SOCIAL HISTORY:     Social History     Tobacco Use    Smoking status: Never Smoker    Smokeless tobacco: Never Used   Substance Use Topics    Alcohol use: No    Drug use: No         FAMILY HISTORY:     Family History   Problem Relation Age of Onset    Cancer Mother         breast in her 62s    Breast Cancer Mother 61    Heart Failure Father         after heart transplant    Hypertension Brother     Heart Attack Brother     Hypertension Brother           REVIEW OF SYMPTOMS:      Review of Symptoms:  Constitutional: Negative for fever, chills  HEENT: Negative for nosebleeds, tinnitus, and vision changes. Respiratory:  + LOMBARDI./cough  Cardiovascular: Negative for orthopnea, claudication, syncope, and PND. Gastrointestinal: Negative for abdominal pain, diarrhea, melena. Genitourinary: Negative for dysuria  Musculoskeletal: Negative for myalgias. Skin: Negative for rash  Heme: No problems bleeding. Neurological: Negative for speech change and focal weakness.            PHYSICAL EXAM:      Physical Exam:  Visit Vitals  /76   Pulse 65   Ht 5' 6\" (1.676 m)   Wt 229 lb (103.9 kg)   LMP 01/01/1995 (Within Months)   BMI 36.96 kg/m²          Patient appears generally well, mood and affect are appropriate and pleasant. HEENT:  Hearing intact, non-icteric, normocephalic, atraumatic. Neck Exam: Supple, No JVD  Lung Exam: Clear to auscultation, even breath sounds. Cardiac Exam: Regular rate and rhythm with brief 2/6 systolic murmur, nml S2  Abdomen: Soft, non-tender, obese   Extremities: Moves all ext well. No lower extremity edema.   Vascular: 2+ DP's  Psych: Appropriate affect  Neuro - Grossly intact           LABS / OTHER STUDIES:               Labs 12/8/19 - Hgb 10.6, D-Dimer nml, proBNP 695, Cr 0.6, K 3.5, trop nml        Component      Latest Ref Rng & Units 12/31/2019 12/18/2019           9:43 AM  8:58 AM   NT pro-BNP      0 - 301 pg/mL 204 231      Component      Latest Ref Rng & Units 2/14/2020           8:51 AM   LDL-P      <1,000 nmol/L 804   LDL-C      0 - 99 mg/dL 50   HDL-C      >39 mg/dL 35 (L)   Triglycerides      0 - 149 mg/dL 121   Cholesterol, total      100 - 199 mg/dL 109   HDL-P (Total)      >=30.5 umol/L 25.3 (L)   Small LDL-P      <=527 nmol/L 468   LDL size      >20.5 nm 20.1 (L)   LP-IR SCORE      <=45 74 (H)              CARDIAC DIAGNOSTICS:      Cardiac Evaluation Includes:  EKG 7/5/18 - NSR, non-specific T wave abn   EKG 12/13/19 - NSR, non-specific T wave abn   EKG 12/18/20 - NSR, non-specific T wave abn         CT Heart Scan 8/2/18 - score of 562 is at the 90th percentile rank. Exercise Cardiolite 8/18 - 7 METS, normal MPI, LVEF 73%     Carotid Doppler 3/26/19 - 10-49% VERO stenosis; Complete occlusion of LICA     Echo 4/22/87 - LVEF 60-65%, mild-mod MR, AV sclerosis      CXR 12/8/19 ([facility]) - small bilat pleural effusions      Echo 1/3/20 - LVEF 60-65%, mild LAE, mild-mod MR     Carotid Doppler 12/18/20  - 10-49% VERO stenosis; Complete occlusion of LICA    Echo 12/04/24 - PRELIM - AV calcifications             ASSESSMENT AND PLAN:      Assessment and Plan:  1) SOB  - On 12/8/19 -  SOB, pleural effusions and elevated proBNP suggestive of CHF - At that time increased her diuretics ---> stop valsartan-HCTZ (she has not had this in a week) and instead start her on chlorthalidone 25 mg daily (half dose initially) and aldactone 25 mg daily   - On FU 1/3/20 -- proBNP has been normal and now she looks dry according to labs (Cr elevated).  She is feeling better on additional diuretics and breathing is much better.  Will continue meds and asked her to stay hydrated with water.  Encourage exercise.    - She is doing well overall. Breathing is good. Continue meds. Labs followed by Endocrine (Dr. Pa Shelton).    2) Carotid Artery disease  - Carotid Doppler 3/26/19 - 10-49% VERO stenosis; Complete occlusion of LICA  - stable 25/10   - cont statin and ASA  - check yearly dopplers --> recheck next visit      3) Dyslipidemia  - she should be on a potent statin given vascular disease   ---> continue atorvastatin at 40 mg daily   - recent lipids OK  - labs followed by Endocrine      4) DM  - managed by Dr. Pa Shelton (Endocrine)      5) HTN  - BP OK  - continue  meds      6) See me in 12 months with carotid dopplers.   Patient expressed understanding of the plan - questions were answered.      She is from [address] for 15[address] years.  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  [address]  [address]  Ph: [phone]                                [phone]

## 2020-12-18 NOTE — PROGRESS NOTES
Artur Mckeon is a 77 y.o. female    Visit Vitals  LMP 01/01/1995 (Within Months)       Chief Complaint   Patient presents with    Cholesterol Problem    Coronary Artery Disease    Hypertension       Chest pain NO  SOB NO  Dizziness NO  Swelling NO  Recent hospital visit NO  Refills NO

## 2021-01-13 ENCOUNTER — OFFICE VISIT (OUTPATIENT)
Dept: INTERNAL MEDICINE CLINIC | Age: 66
End: 2021-01-13
Payer: MEDICARE

## 2021-01-13 VITALS
BODY MASS INDEX: 36.45 KG/M2 | WEIGHT: 226.8 LBS | OXYGEN SATURATION: 98 % | HEART RATE: 84 BPM | RESPIRATION RATE: 20 BRPM | HEIGHT: 66 IN | TEMPERATURE: 97.7 F | DIASTOLIC BLOOD PRESSURE: 76 MMHG | SYSTOLIC BLOOD PRESSURE: 134 MMHG

## 2021-01-13 DIAGNOSIS — Z79.4 TYPE 2 DIABETES MELLITUS WITH HYPERGLYCEMIA, WITH LONG-TERM CURRENT USE OF INSULIN (HCC): ICD-10-CM

## 2021-01-13 DIAGNOSIS — I10 ESSENTIAL HYPERTENSION WITH GOAL BLOOD PRESSURE LESS THAN 140/90: ICD-10-CM

## 2021-01-13 DIAGNOSIS — Z00.00 MEDICARE ANNUAL WELLNESS VISIT, SUBSEQUENT: Primary | ICD-10-CM

## 2021-01-13 DIAGNOSIS — E78.2 MIXED HYPERLIPIDEMIA: ICD-10-CM

## 2021-01-13 DIAGNOSIS — I77.9 BILATERAL CAROTID ARTERY DISEASE, UNSPECIFIED TYPE (HCC): ICD-10-CM

## 2021-01-13 DIAGNOSIS — E11.65 TYPE 2 DIABETES MELLITUS WITH HYPERGLYCEMIA, WITH LONG-TERM CURRENT USE OF INSULIN (HCC): ICD-10-CM

## 2021-01-13 PROCEDURE — G8399 PT W/DXA RESULTS DOCUMENT: HCPCS | Performed by: INTERNAL MEDICINE

## 2021-01-13 PROCEDURE — G8536 NO DOC ELDER MAL SCRN: HCPCS | Performed by: INTERNAL MEDICINE

## 2021-01-13 PROCEDURE — 1101F PT FALLS ASSESS-DOCD LE1/YR: CPT | Performed by: INTERNAL MEDICINE

## 2021-01-13 PROCEDURE — G0463 HOSPITAL OUTPT CLINIC VISIT: HCPCS | Performed by: INTERNAL MEDICINE

## 2021-01-13 PROCEDURE — 2022F DILAT RTA XM EVC RTNOPTHY: CPT | Performed by: INTERNAL MEDICINE

## 2021-01-13 PROCEDURE — G9899 SCRN MAM PERF RSLTS DOC: HCPCS | Performed by: INTERNAL MEDICINE

## 2021-01-13 PROCEDURE — 3017F COLORECTAL CA SCREEN DOC REV: CPT | Performed by: INTERNAL MEDICINE

## 2021-01-13 PROCEDURE — G8427 DOCREV CUR MEDS BY ELIG CLIN: HCPCS | Performed by: INTERNAL MEDICINE

## 2021-01-13 PROCEDURE — G8417 CALC BMI ABV UP PARAM F/U: HCPCS | Performed by: INTERNAL MEDICINE

## 2021-01-13 PROCEDURE — G8754 DIAS BP LESS 90: HCPCS | Performed by: INTERNAL MEDICINE

## 2021-01-13 PROCEDURE — G8510 SCR DEP NEG, NO PLAN REQD: HCPCS | Performed by: INTERNAL MEDICINE

## 2021-01-13 PROCEDURE — 3046F HEMOGLOBIN A1C LEVEL >9.0%: CPT | Performed by: INTERNAL MEDICINE

## 2021-01-13 PROCEDURE — G0439 PPPS, SUBSEQ VISIT: HCPCS | Performed by: INTERNAL MEDICINE

## 2021-01-13 PROCEDURE — 99214 OFFICE O/P EST MOD 30 MIN: CPT | Performed by: INTERNAL MEDICINE

## 2021-01-13 PROCEDURE — G8752 SYS BP LESS 140: HCPCS | Performed by: INTERNAL MEDICINE

## 2021-01-13 PROCEDURE — 1090F PRES/ABSN URINE INCON ASSESS: CPT | Performed by: INTERNAL MEDICINE

## 2021-01-13 NOTE — PATIENT INSTRUCTIONS
Medicare Wellness Visit, Female The best way to live healthy is to have a lifestyle where you eat a well-balanced diet, exercise regularly, limit alcohol use, and quit all forms of tobacco/nicotine, if applicable. Regular preventive services are another way to keep healthy. Preventive services (vaccines, screening tests, monitoring & exams) can help personalize your care plan, which helps you manage your own care. Screening tests can find health problems at the earliest stages, when they are easiest to treat. [facility] follows the current, evidence-based guidelines published by the [facility] ([facility]) when recommending preventive services for our patients. Because we follow these guidelines, sometimes recommendations change over time as research supports it. (For example, mammograms used to be recommended annually. Even though Medicare will still pay for an annual mammogram, the newer guidelines recommend a mammogram every two years for women of average risk). Of course, you and your doctor may decide to screen more often for some diseases, based on your risk and your co-morbidities (chronic disease you are already diagnosed with). Preventive services for you include: - Medicare offers their members a free annual wellness visit, which is time for you and your primary care provider to discuss and plan for your preventive service needs. Take advantage of this benefit every year! 
-All adults over the age of 72 should receive the recommended pneumonia vaccines. Current USPSTF guidelines recommend a series of two vaccines for the best pneumonia protection.  
-All adults should have a flu vaccine yearly and a tetanus vaccine every 10 years.  
-All adults age 48 and older should receive the shingles vaccines (series of two vaccines). -All adults age 38-68 who are overweight should have a diabetes screening test once every three years. -All adults born between [birth year] and 1967 should be screened once for Hepatitis C. 
-Other screening tests and preventive services for persons with diabetes include: an eye exam to screen for diabetic retinopathy, a kidney function test, a foot exam, and stricter control over your cholesterol.  
-Cardiovascular screening for adults with routine risk involves an electrocardiogram (ECG) at intervals determined by your doctor.  
-Colorectal cancer screenings should be done for adults age 54-65 with no increased risk factors for colorectal cancer. There are a number of acceptable methods of screening for this type of cancer. Each test has its own benefits and drawbacks. Discuss with your doctor what is most appropriate for you during your annual wellness visit. The different tests include: colonoscopy (considered the best screening method), a fecal occult blood test, a fecal DNA test, and sigmoidoscopy. 
 
-A bone mass density test is recommended when a woman turns 65 to screen for osteoporosis. This test is only recommended one time, as a screening. Some providers will use this same test as a disease monitoring tool if you already have osteoporosis. -Breast cancer screenings are recommended every other year for women of normal risk, age 54-69. 
-Cervical cancer screenings for women over age 72 are only recommended with certain risk factors. Here is a list of your current Health Maintenance items (your personalized list of preventive services) with a due date: 
Health Maintenance Due Topic Date Due  Glaucoma Screening   07/11/2019 [facility] Eye Exam  07/11/2019  Albumin Urine Test  12/17/2019  Hemoglobin A1C    06/14/2020  Yearly Flu Vaccine (1) 09/01/2020 [facility] Annual Well Visit  01/13/2021

## 2021-01-13 NOTE — PROGRESS NOTES
This is the Subsequent Medicare Annual Wellness Exam, performed 12 months or more after the Initial AWV or the last Subsequent AWV    I have reviewed the patient's medical history in detail and updated the computerized patient record. Depression Risk Factor Screening:     3 most recent PHQ Screens 1/13/2021   Little interest or pleasure in doing things Not at all   Feeling down, depressed, irritable, or hopeless Not at all   Total Score PHQ 2 0       Alcohol Risk Screen    Do you average more than 1 drink per night or more than 7 drinks a week:  No    On any one occasion in the past three months have you have had more than 3 drinks containing alcohol:  No        Functional Ability and Level of Safety:    Hearing: Hearing is good. Activities of Daily Living: The home contains: no safety equipment. Patient does total self care      Ambulation: with no difficulty     Fall Risk:  Fall Risk Assessment, last 12 mths 12/18/2020   Able to walk? Yes   Fall in past 12 months? No      Abuse Screen:  Patient is not abused       Cognitive Screening    Has your family/caregiver stated any concerns about your memory: no     Cognitive Screening: Normal - MMSE (Mini Mental Status Exam)    Assessment/Plan   Education and counseling provided:  Are appropriate based on today's review and evaluation    Diagnoses and all orders for this visit:    1. Type 2 diabetes mellitus with hyperglycemia, with long-term current use of insulin ([address]    2. Essential hypertension with goal blood pressure less than 140/90    3. Mixed hyperlipidemia    4.  Bilateral carotid artery disease, unspecified type [facility])        Health Maintenance Due     Health Maintenance Due   Topic Date Due    GLAUCOMA SCREENING Q2Y  07/11/2019    Eye Exam Retinal or Dilated  07/11/2019    MICROALBUMIN Q1  12/17/2019    A1C test (Diabetic or Prediabetic)  06/14/2020    Flu Vaccine (1) 09/01/2020    Medicare Yearly Exam  01/13/2021       Patient Care Team Patient Care Team:  Brice Mancia MD as PCP - General  Brice Mancia MD as PCP - [facility] Provider  Meir Hill MD (Breast Surgery)  Cale Durand MD (Cardiology)    History     Patient Active Problem List   Diagnosis Code    Hyperlipidemia E78.5    HTN (hypertension) I10    Diabetes ([address] E11.9    DM type 2 (diabetes mellitus, type 2) ([address] E11.9    Hypertension I10    FH ischemic heart disease Z82.49    Severe obesity ([address] E66.01    Atherosclerosis I70.90    Carotid artery disease ([address] I77.9    Mild nonproliferative diabetic retinopathy of both eyes without macular edema ([address] M29.3178    Nuclear nonsenile cataract H26.9    Open angle with borderline findings and low glaucoma risk in both eyes H40.013    Pseudophakia Z96.1    Shortness of breath R06.02     Past Medical History:   Diagnosis Date    Atherosclerosis     CT Heart Scan 8/2/18 - score of 562 is at the 90th percentile rank.  Back pain     Carotid artery disease (HCC)     Diabetes ([address]     Diastolic heart failure ([address]     12/19      ischemic heart disease     brother with heart disease     Hyperlipidemia     Hypertension       Past Surgical History:   Procedure Laterality Date    ENDOSCOPY, COLON, DIAGNOSTIC      2002 normal    HX APPENDECTOMY      HX BREAST BIOPSY Left 06/2017    neg; stereotactic bx    HX GYN      molar pregnancy    HX GYN      CHIQUI part OOphorectomy     Current Outpatient Medications   Medication Sig Dispense Refill    metoprolol succinate (TOPROL-XL) 100 mg tablet Take 1 Tab by mouth daily. 90 Tab 3    chlorthalidone (HYGROTON) 25 mg tablet Take 1 Tab by mouth daily. 90 Tab 3    spironolactone (ALDACTONE) 25 mg tablet Take 1 Tab by mouth daily. 90 Tab 3    atorvastatin (LIPITOR) 40 mg tablet Take 1 Tab by mouth daily. 90 Tab 3    amLODIPine (NORVASC) 10 mg tablet Take 1 Tab by mouth daily. 90 Tab 3    terazosin (HYTRIN) 2 mg capsule Take 1 Cap by mouth daily. 90 Cap 3    aspirin 81 mg chewable tablet CHEW 1 TABLET BY MOUTH DAILY 90 Tab 3    glyBURIDE (DIABETA) 5 mg tablet TAKE 2 TABLETS BY MOUTH TWICE A DAY WITH FOOD 360 Tab 1    metFORMIN (GLUCOPHAGE) 500 mg tablet Take 2 Tabs by mouth two (2) times daily (with meals). (Patient taking differently: Take 1,000 mg by mouth two (2) times daily (with meals). Indications: 2 in the morning and 1 at night) 360 Tab 1    ONETOUCH ULTRA BLUE TEST STRIP strip TEST BLOOD SUGAR 3 TIMES A  Strip 3    insulin aspart U-100 (NOVOLOG) 100 unit/mL inpn 20 units at breakfast, 26 units at lunch, 26 units at dinner. ( please give one month) (Patient taking differently: 80-50 units at breakfast,     30 each units at night  ( please give one month)) 1 Pen 3     No Known Allergies    Family History   Problem Relation Age of Onset    Cancer Mother         breast in her 62s    Breast Cancer Mother 61    Heart Failure Father         after heart transplant    Hypertension Brother     Heart Attack Brother     Hypertension Brother      Social History     Tobacco Use    Smoking status: Never Smoker    Smokeless tobacco: Never Used   Substance Use Topics    Alcohol use:  No

## 2021-01-13 NOTE — PROGRESS NOTES
Jany Dickerson (: 1955) is a 77 y.o. female, established patient, here for evaluation of the following chief complaint(s):  Follow-up       ASSESSMENT/PLAN:  1. Type 2 diabetes mellitus with hyperglycemia, with long-term current use of insulin (HCC)  Sugars stable. Continue to follow diabetic diet and monitor sugars. Discussed importance of exercise on overall health, especially since she is a diabetic and has htn . Will continue to monitor for improvements or changes. 2. Essential hypertension with goal blood pressure less than 140/90  BP is at goal. I do not recommend any change in medications. Discussed importance of exercise on overall health, especially since she is a diabetic and has htn . Will continue to monitor for improvements or changes. 3. Mixed hyperlipidemia  Stable, patient is tolerating medications, no myalgias. I do not recommend any change in medications. Discussed food choices with pt, specifically sweets. 4. Bilateral carotid artery disease, unspecified type ([address]  Followed by Dr. Ramu Alcantar, Stable and well-managed. No change in medications. SUBJECTIVE/OBJECTIVE:  HPI  Diabetic Review of Systems - further diabetic ROS: no polyuria or polydipsia, no chest pain, dyspnea or TIA's, no numbness, tingling or pain in extremities. Other symptoms and concerns: Pt's last a1c was 8.4 on 20. Pt reports that her BG this morning was 113 and it has been averaging in upper 100's over the holidays. She notes that she has cut back on sweets after the holidays. Continues on short and long acting insulin (50 units and 80 units in AM and 30 units each PM). Followed by Dr. Tom Stock. Hypertension ROS: taking medications as instructed, no medication side effects noted, no TIA's, no chest pain on exertion, no dyspnea on exertion, no swelling of ankles. New concerns: BP in office today is 134/76. Pt reports that she is going to start using stationary bike since walking causes back pain. Carotid: Followed by Dr. Bonnie Molina. Stable, pt continues to comply with Toprol XL. Pt reports that she will do scans at next cardiac visit. Pt reports that she is UTD with shingles and pneumonia vaccines. Pt is unsure if she has had flu shot. Review of Systems   All other systems reviewed and are negative. Physical Exam  Constitutional:       Appearance: Normal appearance. HENT:      Right Ear: Tympanic membrane and external ear normal.      Left Ear: Tympanic membrane and external ear normal.      Mouth/Throat:      Mouth: Mucous membranes are moist.      Pharynx: Oropharynx is clear. Cardiovascular:      Rate and Rhythm: Normal rate and regular rhythm. Pulses: Normal pulses. Heart sounds: Normal heart sounds. Pulmonary:      Effort: Pulmonary effort is normal.      Breath sounds: Normal breath sounds. Musculoskeletal: Normal range of motion. Skin:     General: Skin is warm and dry. Neurological:      General: No focal deficit present. Mental Status: She is alert and oriented to person, place, and time. Psychiatric:         Mood and Affect: Mood normal.         Behavior: Behavior normal.           An electronic signature was used to authenticate this note.   -- Robel Diamond

## 2021-02-26 DIAGNOSIS — Z82.49 FH ISCHEMIC HEART DISEASE: ICD-10-CM

## 2021-03-01 RX ORDER — GUAIFENESIN 100 MG/5ML
LIQUID (ML) ORAL
Qty: 90 TAB | Refills: 3 | Status: SHIPPED | OUTPATIENT
Start: 2021-03-01

## 2021-06-09 ENCOUNTER — TELEPHONE (OUTPATIENT)
Dept: CARDIOLOGY CLINIC | Age: 66
End: 2021-06-09

## 2021-06-09 DIAGNOSIS — I77.9 BILATERAL CAROTID ARTERY DISEASE, UNSPECIFIED TYPE (HCC): Primary | ICD-10-CM

## 2021-06-09 NOTE — TELEPHONE ENCOUNTER
Called pt,  \"See me in 12 months with carotid dopplers. \"    LVM  Asking if having any s/s that she would need to have it sooner, or if she wants to schedule her annual OV/Carotids for 12/2021? Order in cc please schedule as appropriate.

## 2021-07-12 ENCOUNTER — TELEPHONE (OUTPATIENT)
Dept: INTERNAL MEDICINE CLINIC | Age: 66
End: 2021-07-12

## 2021-08-02 ENCOUNTER — TRANSCRIBE ORDER (OUTPATIENT)
Dept: SCHEDULING | Age: 66
End: 2021-08-02

## 2021-08-02 DIAGNOSIS — Z12.31 VISIT FOR SCREENING MAMMOGRAM: Primary | ICD-10-CM

## 2021-08-02 LAB — HBA1C MFR BLD HPLC: 8.2 %

## 2021-08-02 NOTE — PROGRESS NOTES
Brayden Del Real (: 1955) is a 74 y.o. female, established patient, here for evaluation of the following chief complaint(s):  Follow-up (diabetes)       ASSESSMENT/PLAN:  Below is the assessment and plan developed based on review of pertinent history, physical exam, labs, studies, and medications. 1. Chronic low back pain, unspecified back pain laterality, unspecified whether sciatica present  -     MRI LUMB SPINE WO CONT; Future  I will order an updated MRI of the pt's back for her to have when she sees Dr. Elena Haney. 2. Essential hypertension with goal blood pressure less than 140/90  -     amLODIPine (NORVASC) 10 mg tablet; Take 1 Tablet by mouth daily. , Normal, Disp-90 Tablet, R-3  -     metFORMIN (GLUCOPHAGE) 500 mg tablet; Take 2 Tablets by mouth two (2) times daily (with meals). , Normal, Disp-360 Tablet, R-1  BP is at goal. I do not recommend any change in medications. Continue with ongoing regimen of  metoprolol, chiorthalidone, spironolactone, amlodipine, and hytrin. 3. Mixed hyperlipidemia  -     atorvastatin (LIPITOR) 40 mg tablet; Take 1 Tablet by mouth daily. , Normal, Disp-90 Tablet, R-3  Stable, patient is tolerating medications, no myalgias. I do not recommend any change in medications. Continue with ongoing regimen of atorvastatin. 4. Controlled type 2 diabetes mellitus without complication, unspecified whether long term insulin use (HCC)  -     glyBURIDE (DIABETA) 5 mg tablet; TAKE 2 TABLETS BY MOUTH TWICE A DAY WITH FOOD, Normal, Disp-360 Tablet, R-1  Stable and well-managed by endocrinology (Dr. Roderick Payton). Continue with ongoing regimen of diabeta, metformin, and novolin. 5. Severe obesity (BMI 35.0-35.9 with comorbidity) ([address]  I recommended that the pt consume a healthy diet. I am hopeful that if her back is fixed, she will be able to exercise. No follow-ups on file.         SUBJECTIVE/OBJECTIVE:  HPI    Hypertension ROS: taking medications as instructed, no medication side effects noted, no TIA's, no chest pain on exertion, no dyspnea on exertion, no swelling of ankles. BP in office today is 135/76. Diabetic Review of Systems - medication compliance: compliant all of the time. Other symptoms and concerns: Pt's last a1c was 7.5 on 6/14/19. She states that her sugars are \"better but not where they should be. \" Pt saw Dr. Davide Almeida yesterday and is waiting to hear back about her blood work. She is taking 1 tablet of metformin at night and 2 in the morning. Pt is taking 90 units of Novolin N in the morning and 28 at night. She is taking 50 units of Novolin  R in the morning and 30 at night. Pt notes occasional neuropathy when she wakes up that is managed by sleeping in a different position. Back Pain: Pt is interested in seeing Dr. Avery Pickett about her back pain. She states that she experiences \"knotting\" in her back after walking or standing for about 5 minutes. Pt denies numbness or tingling. She underwent PT in the past without relief. Hyperlipidemia:  Cardiovascular risk analysis - 74 y.o. female LDL goal is under 100. ROS: taking medications as instructed, no medication side effects noted, no TIA's, no chest pain on exertion, no dyspnea on exertion, no swelling of ankles. Tolerating meds, no myalgias or other side effects noted. New concerns: Pt's last LDL was 50 on 2/14/20. Review of Systems   Musculoskeletal: Positive for back pain. All other systems reviewed and are negative. Physical Exam  Constitutional:       Appearance: Normal appearance. HENT:      Right Ear: Tympanic membrane and external ear normal.      Left Ear: Tympanic membrane and external ear normal.      Mouth/Throat:      Mouth: Mucous membranes are moist.      Pharynx: Oropharynx is clear. Cardiovascular:      Rate and Rhythm: Normal rate and regular rhythm. Pulses: Normal pulses. Heart sounds: Normal heart sounds.    Pulmonary:      Effort: Pulmonary effort is normal. Breath sounds: Normal breath sounds. Musculoskeletal:         General: Normal range of motion. Skin:     General: Skin is warm and dry. Neurological:      General: No focal deficit present. Mental Status: She is alert and oriented to person, place, and time. Psychiatric:         Mood and Affect: Mood normal.         Behavior: Behavior normal.       On this date 08/03/2021 I have spent 30 minutes reviewing previous notes, test results and face to face with the patient discussing the diagnosis and importance of compliance with the treatment plan as well as documenting on the day of the visit. An electronic signature was used to authenticate this note. Written by Rina Jones as dictated by Dr. Shahram Villarreal.    -- Rina Jones

## 2021-08-03 ENCOUNTER — OFFICE VISIT (OUTPATIENT)
Dept: INTERNAL MEDICINE CLINIC | Age: 66
End: 2021-08-03
Payer: MEDICARE

## 2021-08-03 VITALS
BODY MASS INDEX: 37.41 KG/M2 | TEMPERATURE: 98 F | WEIGHT: 232.8 LBS | HEIGHT: 66 IN | SYSTOLIC BLOOD PRESSURE: 135 MMHG | RESPIRATION RATE: 16 BRPM | HEART RATE: 99 BPM | DIASTOLIC BLOOD PRESSURE: 76 MMHG | OXYGEN SATURATION: 98 %

## 2021-08-03 DIAGNOSIS — E11.9 CONTROLLED TYPE 2 DIABETES MELLITUS WITHOUT COMPLICATION, UNSPECIFIED WHETHER LONG TERM INSULIN USE (HCC): ICD-10-CM

## 2021-08-03 DIAGNOSIS — E66.01 SEVERE OBESITY (BMI 35.0-35.9 WITH COMORBIDITY) (HCC): ICD-10-CM

## 2021-08-03 DIAGNOSIS — M54.50 CHRONIC LOW BACK PAIN, UNSPECIFIED BACK PAIN LATERALITY, UNSPECIFIED WHETHER SCIATICA PRESENT: Primary | ICD-10-CM

## 2021-08-03 DIAGNOSIS — E78.2 MIXED HYPERLIPIDEMIA: ICD-10-CM

## 2021-08-03 DIAGNOSIS — I10 ESSENTIAL HYPERTENSION WITH GOAL BLOOD PRESSURE LESS THAN 140/90: ICD-10-CM

## 2021-08-03 DIAGNOSIS — G89.29 CHRONIC LOW BACK PAIN, UNSPECIFIED BACK PAIN LATERALITY, UNSPECIFIED WHETHER SCIATICA PRESENT: Primary | ICD-10-CM

## 2021-08-03 PROCEDURE — 3046F HEMOGLOBIN A1C LEVEL >9.0%: CPT | Performed by: INTERNAL MEDICINE

## 2021-08-03 PROCEDURE — 3017F COLORECTAL CA SCREEN DOC REV: CPT | Performed by: INTERNAL MEDICINE

## 2021-08-03 PROCEDURE — G8417 CALC BMI ABV UP PARAM F/U: HCPCS | Performed by: INTERNAL MEDICINE

## 2021-08-03 PROCEDURE — G8752 SYS BP LESS 140: HCPCS | Performed by: INTERNAL MEDICINE

## 2021-08-03 PROCEDURE — G8536 NO DOC ELDER MAL SCRN: HCPCS | Performed by: INTERNAL MEDICINE

## 2021-08-03 PROCEDURE — G9899 SCRN MAM PERF RSLTS DOC: HCPCS | Performed by: INTERNAL MEDICINE

## 2021-08-03 PROCEDURE — G8399 PT W/DXA RESULTS DOCUMENT: HCPCS | Performed by: INTERNAL MEDICINE

## 2021-08-03 PROCEDURE — G8427 DOCREV CUR MEDS BY ELIG CLIN: HCPCS | Performed by: INTERNAL MEDICINE

## 2021-08-03 PROCEDURE — 1090F PRES/ABSN URINE INCON ASSESS: CPT | Performed by: INTERNAL MEDICINE

## 2021-08-03 PROCEDURE — G8432 DEP SCR NOT DOC, RNG: HCPCS | Performed by: INTERNAL MEDICINE

## 2021-08-03 PROCEDURE — G8754 DIAS BP LESS 90: HCPCS | Performed by: INTERNAL MEDICINE

## 2021-08-03 PROCEDURE — 1101F PT FALLS ASSESS-DOCD LE1/YR: CPT | Performed by: INTERNAL MEDICINE

## 2021-08-03 PROCEDURE — G0463 HOSPITAL OUTPT CLINIC VISIT: HCPCS | Performed by: INTERNAL MEDICINE

## 2021-08-03 PROCEDURE — 2022F DILAT RTA XM EVC RTNOPTHY: CPT | Performed by: INTERNAL MEDICINE

## 2021-08-03 PROCEDURE — 99214 OFFICE O/P EST MOD 30 MIN: CPT | Performed by: INTERNAL MEDICINE

## 2021-08-03 RX ORDER — GLYBURIDE 5 MG/1
TABLET ORAL
Qty: 360 TABLET | Refills: 1 | Status: SHIPPED | OUTPATIENT
Start: 2021-08-03

## 2021-08-03 RX ORDER — AMLODIPINE BESYLATE 10 MG/1
10 TABLET ORAL DAILY
Qty: 90 TABLET | Refills: 3 | Status: SHIPPED | OUTPATIENT
Start: 2021-08-03

## 2021-08-03 RX ORDER — ATORVASTATIN CALCIUM 40 MG/1
40 TABLET, FILM COATED ORAL DAILY
Qty: 90 TABLET | Refills: 3 | Status: SHIPPED | OUTPATIENT
Start: 2021-08-03 | End: 2022-07-07

## 2021-08-03 RX ORDER — METFORMIN HYDROCHLORIDE 500 MG/1
1000 TABLET ORAL 2 TIMES DAILY WITH MEALS
Qty: 360 TABLET | Refills: 1 | Status: SHIPPED | OUTPATIENT
Start: 2021-08-03 | End: 2022-01-29

## 2021-09-01 ENCOUNTER — TELEPHONE (OUTPATIENT)
Dept: INTERNAL MEDICINE CLINIC | Age: 66
End: 2021-09-01

## 2021-09-01 NOTE — TELEPHONE ENCOUNTER
Left v/m for patient that she should check with friends or family in the new area. Advised her to once she locates a new physician to forward a records release and her records will be sent to the new physician.

## 2021-09-01 NOTE — TELEPHONE ENCOUNTER
Pt calling states she will be moving out of the state.  Pt wants to know what details  can provide her with finding a new Dr. Please call back and advise

## 2022-01-19 RX ORDER — CHLORTHALIDONE 25 MG/1
25 TABLET ORAL DAILY
Qty: 90 TABLET | Refills: 3 | Status: CANCELLED | OUTPATIENT
Start: 2022-01-19

## 2022-01-19 NOTE — TELEPHONE ENCOUNTER
Horacio Patel from [address] [facility] called stating that the patient currently takes Chlorthalidone, and the insurance company will no longer cover the medication. Stated they will cover hydrochlorothiazide. Wanted to confirm if the patient can take hydrochlorothiazide.      Phone: [phone]

## 2022-01-19 NOTE — TELEPHONE ENCOUNTER
Called pt,  Per Dr. Donna Patel: \"Cont chlorthalidone if she is doing OK on it ---- Use [facility] and go to [facility] (or whatever is cheap on [facility] website)  \"

## 2022-01-19 NOTE — TELEPHONE ENCOUNTER
Patient is returning nurse call about medication that Carroll Can had a question about. Patient would also like to give her new doctors name Rani Craig she is an internist doctor [place]. They relocated to [place].     [phone]

## 2022-01-21 DIAGNOSIS — I10 ESSENTIAL HYPERTENSION WITH GOAL BLOOD PRESSURE LESS THAN 140/90: ICD-10-CM

## 2022-01-21 RX ORDER — METOPROLOL SUCCINATE 100 MG/1
TABLET, EXTENDED RELEASE ORAL
Qty: 90 TABLET | Refills: 3 | OUTPATIENT
Start: 2022-01-21

## 2022-01-21 RX ORDER — CHLORTHALIDONE 25 MG/1
TABLET ORAL
Qty: 90 TABLET | Refills: 3 | OUTPATIENT
Start: 2022-01-21

## 2022-01-21 NOTE — TELEPHONE ENCOUNTER
Refill per VO of Dr. Anali Denton  Last appt: Visit date not found  Future Appointments   Date Time Provider Deonte Christianson   2/3/2022 11:00 AM Alf Bajwa MD [facility] BS AMB       Requested Prescriptions     Pending Prescriptions Disp Refills    chlorthalidone (HYGROTON) 25 mg tablet [Pharmacy Med Name: CHLORTHALIDONE 25 MG TABLET] 90 Tablet 3     Sig: TAKE 1 TABLET BY MOUTH EVERY DAY    metoprolol succinate (TOPROL-XL) 100 mg tablet [Pharmacy Med Name: METOPROLOL SUCC  MG TAB] 90 Tablet 3     Sig: TAKE 1 TABLET BY MOUTH EVERY DAY     Refill was denied because,  PT NEEDS AN APPOINTMENT

## 2022-01-28 DIAGNOSIS — I10 ESSENTIAL HYPERTENSION WITH GOAL BLOOD PRESSURE LESS THAN 140/90: ICD-10-CM

## 2022-01-29 RX ORDER — METFORMIN HYDROCHLORIDE 500 MG/1
TABLET ORAL
Qty: 360 TABLET | Refills: 1 | Status: SHIPPED | OUTPATIENT
Start: 2022-01-29

## 2022-01-30 RX ORDER — SPIRONOLACTONE 25 MG/1
TABLET ORAL
Qty: 90 TABLET | Refills: 3 | OUTPATIENT
Start: 2022-01-30

## 2022-01-30 RX ORDER — TERAZOSIN 2 MG/1
CAPSULE ORAL
Qty: 90 CAPSULE | OUTPATIENT
Start: 2022-01-30

## 2022-01-30 NOTE — TELEPHONE ENCOUNTER
Refill per VO of Dr. Kem Seay  Last appt: Visit date not found  Future Appointments   Date Time Provider Deonte Meghan   2/3/2022 11:00 AM Megan Bajwa MD [facility] BS AMB       Requested Prescriptions     Pending Prescriptions Disp Refills    terazosin (HYTRIN) 2 mg capsule [Pharmacy Med Name: TERAZOSIN 2 MG CAPSULE] 90 Capsule      Sig: TAKE 1 CAPSULE BY MOUTH EVERY DAY    spironolactone (ALDACTONE) 25 mg tablet [Pharmacy Med Name: SPIRONOLACTONE 25 MG TABLET] 90 Tablet 3     Sig: TAKE 1 TABLET BY MOUTH EVERY DAY

## 2022-03-19 PROBLEM — E11.3293 MILD NONPROLIFERATIVE DIABETIC RETINOPATHY OF BOTH EYES WITHOUT MACULAR EDEMA (HCC): Status: ACTIVE | Noted: 2017-07-10

## 2022-03-20 PROBLEM — Z82.49 FH ISCHEMIC HEART DISEASE: Status: ACTIVE | Noted: 2018-07-05

## 2022-03-20 PROBLEM — R06.02 SHORTNESS OF BREATH: Status: ACTIVE | Noted: 2020-01-13

## 2022-03-20 PROBLEM — E66.01 SEVERE OBESITY (HCC): Status: ACTIVE | Noted: 2018-12-17

## 2022-05-03 RX ORDER — SPIRONOLACTONE 25 MG/1
TABLET ORAL
Qty: 90 TABLET | Refills: 3 | OUTPATIENT
Start: 2022-05-03

## 2022-05-03 NOTE — TELEPHONE ENCOUNTER
Refill per VO of Dr. Sallie Tompkins  Last appt: 12/18/20  No future appointments.     Requested Prescriptions     Refused Prescriptions Disp Refills    spironolactone (ALDACTONE) 25 mg tablet [Pharmacy Med Name: SPIRONOLACTONE 25 MG TABLET] 90 Tablet 3     Sig: TAKE 1 TABLET BY MOUTH EVERY DAY     Refused By: Rios Ying     Reason for Refusal: Appt required, please call patient

## 2022-10-11 DIAGNOSIS — E78.2 MIXED HYPERLIPIDEMIA: ICD-10-CM

## 2022-10-12 RX ORDER — ATORVASTATIN CALCIUM 40 MG/1
TABLET, FILM COATED ORAL
Qty: 30 TABLET | Refills: 0 | OUTPATIENT
Start: 2022-10-12

## 2022-10-12 NOTE — TELEPHONE ENCOUNTER
FYI: PSR reached out to patient to inform she needs med check to have medications refilled - no answer, left detailed VM for call back. If patient calls back please assist in scheduling.

## 2024-03-11 NOTE — PROGRESS NOTES
Robel Manning is a 75 y.o. female    No chief complaint on file. Chest pain No    SOB patient states some SOB when walking. Dizziness No    Swelling No    Refills No    Visit Vitals  /80 (BP 1 Location: Left arm, BP Patient Position: Sitting)   Pulse 64   Ht 5' 6\" (1.676 m)   Wt 224 lb (101.6 kg)   LMP 01/01/1995 (Within Months)   SpO2 97%   BMI 36.15 kg/m²       1. Have you been to the ER, urgent care clinic since your last visit? Hospitalized since your last visit? no    2. Have you seen or consulted any other health care providers outside of the [address] [place] [address] since your last visit? Include any pap smears or colon screening.   no We will call with results of xray